# Patient Record
Sex: FEMALE | Race: WHITE | NOT HISPANIC OR LATINO | Employment: UNEMPLOYED | URBAN - METROPOLITAN AREA
[De-identification: names, ages, dates, MRNs, and addresses within clinical notes are randomized per-mention and may not be internally consistent; named-entity substitution may affect disease eponyms.]

---

## 2022-04-07 ENCOUNTER — HISTORICAL (OUTPATIENT)
Dept: ADMINISTRATIVE | Facility: HOSPITAL | Age: 67
End: 2022-04-07

## 2022-04-24 VITALS
OXYGEN SATURATION: 100 % | WEIGHT: 146.63 LBS | BODY MASS INDEX: 25.03 KG/M2 | HEIGHT: 64 IN | DIASTOLIC BLOOD PRESSURE: 78 MMHG | SYSTOLIC BLOOD PRESSURE: 119 MMHG

## 2022-11-09 ENCOUNTER — OFFICE VISIT (OUTPATIENT)
Dept: FAMILY MEDICINE | Facility: CLINIC | Age: 67
End: 2022-11-09
Payer: MEDICARE

## 2022-11-09 VITALS
OXYGEN SATURATION: 100 % | HEIGHT: 64 IN | TEMPERATURE: 98 F | HEART RATE: 82 BPM | DIASTOLIC BLOOD PRESSURE: 75 MMHG | SYSTOLIC BLOOD PRESSURE: 112 MMHG | BODY MASS INDEX: 24.92 KG/M2 | RESPIRATION RATE: 18 BRPM | WEIGHT: 146 LBS

## 2022-11-09 DIAGNOSIS — L82.1 SEBORRHEIC KERATOSIS: ICD-10-CM

## 2022-11-09 DIAGNOSIS — Z85.820 H/O MALIGNANT MELANOMA: Primary | ICD-10-CM

## 2022-11-09 PROCEDURE — 99214 OFFICE O/P EST MOD 30 MIN: CPT | Mod: PBBFAC | Performed by: FAMILY MEDICINE

## 2022-11-09 PROCEDURE — 17110 DESTRUCTION B9 LES UP TO 14: CPT | Mod: PBBFAC | Performed by: FAMILY MEDICINE

## 2022-11-09 NOTE — PROGRESS NOTES
Subjective:       Patient ID: Bronwyn Mistry is a 67 y.o. female.    Chief Complaint: skin evaluation    HPI    68 yo with h/o of melanoma of left forearm and BCC on nose presents to minor surgery with complaint of skin lesions on her face. States that these were not present in the past and are bothersome to her. She has not been followed by dermatology recently (last with Dr Alexander in 2018).     Review of Systems  As per HPI         Objective:      Vitals:    11/09/22 1415   BP: 112/75   Pulse: 82   Resp: 18   Temp: 97.5 °F (36.4 °C)       Physical Exam    Gen: alert, no acute distress  Skin: site of melanoma excision on left forearm clear to inspection; BCC site on nose also appears clear. Several stuck on slightly hyperpigmented lesions consistent with SK across face. Dilated pore on back. Diffusely dry skin. Some solar lentigines present.   Psych: cooperative, appropriate mood and affect      Assessment/Plan:  H/O Malignant melanoma  -     Ambulatory referral/consult to Dermatology; Future; Expected date: 11/16/2022  -     Ambulatory referral/consult to Ophthalmology; Future; Expected date: 11/16/2022    Seborrheic keratosis  - cryo today  - Post care instructions and return precautions discussed.     Will also have patient return for excision of dilated pore     Follow up in about 4 weeks (around 12/7/2022).

## 2022-12-07 ENCOUNTER — OFFICE VISIT (OUTPATIENT)
Dept: FAMILY MEDICINE | Facility: CLINIC | Age: 67
End: 2022-12-07
Payer: MEDICARE

## 2022-12-07 VITALS
WEIGHT: 146 LBS | HEIGHT: 64 IN | BODY MASS INDEX: 24.92 KG/M2 | HEART RATE: 82 BPM | DIASTOLIC BLOOD PRESSURE: 85 MMHG | TEMPERATURE: 99 F | OXYGEN SATURATION: 100 % | SYSTOLIC BLOOD PRESSURE: 128 MMHG | RESPIRATION RATE: 18 BRPM

## 2022-12-07 DIAGNOSIS — D23.9 DILATED PORE OF WINER: Primary | ICD-10-CM

## 2022-12-07 DIAGNOSIS — Z85.820 H/O MALIGNANT MELANOMA: ICD-10-CM

## 2022-12-07 PROCEDURE — 99213 OFFICE O/P EST LOW 20 MIN: CPT | Mod: PBBFAC | Performed by: FAMILY MEDICINE

## 2022-12-07 NOTE — PROGRESS NOTES
Subjective:       Patient ID: Bronwyn Mistry is a 67 y.o. female.    Chief Complaint: dilated pore    HPI  66 yo with h/o of melanoma of her left forearm and BCC of her nose returns to minor surgery clinic. She had SKs treated with cryo at the last appointment and reports resolution of them. Pt also has a dilated pore on her back that she originally wanted removed but no longer wishes to do so at this time. She was previously followed in derm clinic with Dr Alexander (last in 2018) and has been referred back to dermatology clinic.     Review of Systems  As per HPI         Objective:      Vitals:    12/07/22 1451   BP: 128/85   Pulse: 82   Resp: 18   Temp: 98.6 °F (37 °C)       Physical Exam      Gen: alert, no acute distress  Skin: site of melanoma excision on left forearm clear to inspection; BCC site on nose also appears clear. Dilated pore on back. Diffusely dry skin. Some solar lentigines present.   Psych: cooperative, appropriate mood and affect    Assessment/Plan:  Dilated pore of Virginie    H/O Malignant melanoma    - patient declines any procedure today  - pt to call clinic if she wishes for removal or for any other changing lesions/concerns  - keep dermatology follow up for skin checks     Follow up if symptoms worsen or fail to improve.

## 2022-12-13 ENCOUNTER — OFFICE VISIT (OUTPATIENT)
Dept: OPHTHALMOLOGY | Facility: CLINIC | Age: 67
End: 2022-12-13
Payer: MEDICARE

## 2022-12-13 VITALS — HEIGHT: 63 IN | BODY MASS INDEX: 25.87 KG/M2 | WEIGHT: 146 LBS

## 2022-12-13 DIAGNOSIS — Z85.820 H/O MALIGNANT MELANOMA: ICD-10-CM

## 2022-12-13 PROCEDURE — 92250 FUNDUS PHOTOGRAPHY W/I&R: CPT | Mod: PBBFAC,PO | Performed by: STUDENT IN AN ORGANIZED HEALTH CARE EDUCATION/TRAINING PROGRAM

## 2022-12-13 PROCEDURE — 99213 OFFICE O/P EST LOW 20 MIN: CPT | Mod: PBBFAC,PO | Performed by: STUDENT IN AN ORGANIZED HEALTH CARE EDUCATION/TRAINING PROGRAM

## 2022-12-13 RX ORDER — LINACLOTIDE 145 UG/1
145 CAPSULE, GELATIN COATED ORAL EVERY MORNING
Status: ON HOLD | COMMUNITY
Start: 2022-08-07 | End: 2023-04-17 | Stop reason: HOSPADM

## 2022-12-13 RX ORDER — AMITRIPTYLINE HYDROCHLORIDE 150 MG/1
150 TABLET ORAL NIGHTLY
COMMUNITY
Start: 2022-11-08

## 2022-12-13 RX ORDER — VENLAFAXINE HYDROCHLORIDE 150 MG/1
150 CAPSULE, EXTENDED RELEASE ORAL
Status: ON HOLD | COMMUNITY
Start: 2022-07-20 | End: 2023-04-17 | Stop reason: HOSPADM

## 2022-12-13 RX ORDER — ROSUVASTATIN CALCIUM 10 MG/1
10 TABLET, COATED ORAL
COMMUNITY
Start: 2022-08-29

## 2022-12-13 RX ORDER — CLONAZEPAM 1 MG/1
1 TABLET ORAL 2 TIMES DAILY PRN
COMMUNITY
Start: 2022-11-29

## 2022-12-13 RX ORDER — ONDANSETRON 8 MG/1
8 TABLET, ORALLY DISINTEGRATING ORAL EVERY 6 HOURS PRN
Status: ON HOLD | COMMUNITY
Start: 2022-09-22 | End: 2023-04-17 | Stop reason: HOSPADM

## 2022-12-13 RX ORDER — PHENAZOPYRIDINE HYDROCHLORIDE 100 MG/1
200 TABLET, FILM COATED ORAL 3 TIMES DAILY
Status: ON HOLD | COMMUNITY
Start: 2022-06-23 | End: 2023-04-17 | Stop reason: HOSPADM

## 2022-12-13 RX ORDER — CALCIUM CARBONATE 600 MG
1 TABLET ORAL DAILY
COMMUNITY
Start: 2022-02-21

## 2022-12-13 RX ORDER — PROPRANOLOL HYDROCHLORIDE 20 MG/1
20 TABLET ORAL 2 TIMES DAILY
COMMUNITY
Start: 2022-12-05

## 2022-12-13 RX ORDER — VENLAFAXINE HYDROCHLORIDE 75 MG/1
1 TABLET, EXTENDED RELEASE ORAL
COMMUNITY
Start: 2022-12-01

## 2022-12-13 RX ORDER — BREXPIPRAZOLE 2 MG/1
1 TABLET ORAL
Status: ON HOLD | COMMUNITY
Start: 2022-11-13 | End: 2023-04-17 | Stop reason: HOSPADM

## 2022-12-13 RX ORDER — SULFAMETHOXAZOLE AND TRIMETHOPRIM 800; 160 MG/1; MG/1
1 TABLET ORAL 2 TIMES DAILY
COMMUNITY
Start: 2022-10-24 | End: 2023-04-10 | Stop reason: CLARIF

## 2022-12-13 RX ORDER — ANASTROZOLE 1 MG/1
1 TABLET ORAL
COMMUNITY
Start: 2022-09-06

## 2022-12-13 RX ORDER — ERGOCALCIFEROL 1.25 MG/1
50000 CAPSULE ORAL
COMMUNITY
Start: 2022-11-10

## 2022-12-13 NOTE — PROGRESS NOTES
HPI     Blurred Vision     Additional comments: OU distance and near. Patient does not wear any   glasses.            Melanoma     Additional comments: History of malignant melanoma left forearm and BCC   nose. Patient's PCP referred her over to have eyes checked to make sure   she does not have melanoma in eyes.           Last edited by Santhosh Acosta MA on 12/13/2022 11:09 AM.      Assessment /Plan     For exam results, see Encounter Report.    H/O Malignant melanoma  -     Ambulatory referral/consult to Ophthalmology      1. Hx of melanoma and nasal BCC w flap  - referred for eye exam to check for choroidal melanoma  - dfe wnl  - fundus photos 12/13/22    2. Refractive error  - discussed would likely see better given pinhole results but happy with otc readers  - offered return in future if she wants mrx updated.    RTC 1 year or sooner for MRx

## 2023-03-10 ENCOUNTER — OFFICE VISIT (OUTPATIENT)
Dept: DERMATOLOGY | Facility: CLINIC | Age: 68
End: 2023-03-10
Payer: MEDICARE

## 2023-03-10 VITALS
DIASTOLIC BLOOD PRESSURE: 89 MMHG | HEART RATE: 86 BPM | HEIGHT: 63 IN | RESPIRATION RATE: 18 BRPM | OXYGEN SATURATION: 97 % | BODY MASS INDEX: 26.22 KG/M2 | SYSTOLIC BLOOD PRESSURE: 133 MMHG | WEIGHT: 148 LBS | TEMPERATURE: 98 F

## 2023-03-10 DIAGNOSIS — Z85.828 HISTORY OF BASAL CELL CANCER: Primary | ICD-10-CM

## 2023-03-10 DIAGNOSIS — Z85.820 H/O MALIGNANT MELANOMA: ICD-10-CM

## 2023-03-10 PROBLEM — K59.00 CONSTIPATION: Status: ACTIVE | Noted: 2023-03-10

## 2023-03-10 PROBLEM — E78.5 HYPERLIPIDEMIA: Status: ACTIVE | Noted: 2022-01-12

## 2023-03-10 PROBLEM — D05.12 DUCTAL CARCINOMA IN SITU (DCIS) OF LEFT BREAST: Status: ACTIVE | Noted: 2022-01-12

## 2023-03-10 PROBLEM — R92.8 ABNORMAL ULTRASOUND OF BREAST: Status: ACTIVE | Noted: 2021-12-09

## 2023-03-10 PROBLEM — Z79.811 USE OF ANASTROZOLE (ARIMIDEX): Status: ACTIVE | Noted: 2022-07-18

## 2023-03-10 PROBLEM — Z80.3 FAMILY HISTORY OF BREAST CANCER IN SISTER: Status: ACTIVE | Noted: 2021-12-09

## 2023-03-10 PROBLEM — F41.9 ANXIETY: Status: ACTIVE | Noted: 2023-03-10

## 2023-03-10 PROBLEM — I10 HYPERTENSION: Status: ACTIVE | Noted: 2022-01-12

## 2023-03-10 PROBLEM — R10.13 EPIGASTRIC PAIN: Status: ACTIVE | Noted: 2023-03-10

## 2023-03-10 PROCEDURE — 17000 DESTRUCT PREMALG LESION: CPT | Mod: PBBFAC

## 2023-03-10 PROCEDURE — 88305 TISSUE EXAM BY PATHOLOGIST: CPT | Mod: TC | Performed by: DERMATOLOGY

## 2023-03-10 PROCEDURE — 11302 SHAVE SKIN LESION 1.1-2.0 CM: CPT | Mod: PBBFAC | Performed by: STUDENT IN AN ORGANIZED HEALTH CARE EDUCATION/TRAINING PROGRAM

## 2023-03-10 PROCEDURE — 99215 OFFICE O/P EST HI 40 MIN: CPT | Mod: PBBFAC | Performed by: DERMATOLOGY

## 2023-03-10 PROCEDURE — 17003 DESTRUCT PREMALG LES 2-14: CPT | Mod: PBBFAC | Performed by: STUDENT IN AN ORGANIZED HEALTH CARE EDUCATION/TRAINING PROGRAM

## 2023-03-10 RX ORDER — BREXPIPRAZOLE 2 MG/1
1 TABLET ORAL DAILY
COMMUNITY
Start: 2023-02-15

## 2023-03-10 RX ORDER — ONDANSETRON 8 MG/1
1 TABLET, ORALLY DISINTEGRATING ORAL EVERY 6 HOURS PRN
Status: ON HOLD | COMMUNITY
Start: 2022-09-22 | End: 2023-04-17 | Stop reason: HOSPADM

## 2023-03-10 RX ORDER — LIDOCAINE HYDROCHLORIDE 10 MG/ML
5 INJECTION INFILTRATION; PERINEURAL ONCE
Status: COMPLETED | OUTPATIENT
Start: 2023-03-10 | End: 2023-03-10

## 2023-03-10 RX ADMIN — LIDOCAINE HYDROCHLORIDE 5 ML: 10 INJECTION INFILTRATION; PERINEURAL at 10:03

## 2023-03-10 NOTE — PROGRESS NOTES
Subjective:       Patient ID: Bronwyn Mistry is a 68 y.o. female.    Chief Complaint: skin evaluation    HPI  69 yo F with PMH of  malignant melanoma and basal cell carcinoma is here for skin cancer screening,    She has remote history of cancer treated with skin excision. Patient reports feeling there are several abnormal skin lesions.     Review of Systems    See above  Objective:      Vitals:    03/10/23 0919   BP: 133/89   Pulse: 86   Resp: 18   Temp: 97.8 °F (36.6 °C)     Physical Exam    Gen jb: NAD  Skin: Right forearm with a irregular shaped, scaly patch with raised edge   Right lateral nasal bridge with rough, raised patch    Procedures:  Cryosurgery to 2 actinic keratoses - right nasal sidewall. Discussed with patient that areas treated with blister (possibly for blood blister), scab and peel off within the next two to three weeks.     Procedure: Shave biopsy of Right Forearm  After obtaining informed consent,  a time out was performed. The area was prepped and draped in the usual fashion. Anesthesia was obtained with 1 % lidocaine. The superficial layers of the lesion were removed by shave biopsy. Hemostasis obtained with pressure and Drysol. Bandage applied over wounds. Wound care discussed. Specimen sent for dermatopathology.     Assessment:       Problem List Items Addressed This Visit          Oncology    H/O Malignant melanoma     Other Visit Diagnoses       History of basal cell cancer    -  Primary    Relevant Orders    Specimen to Pathology Dermatology and skin neoplasms              Plan:         1. H/O Malignant melanoma  2. History of basal cell cancer    - Specimen to Pathology Dermatology and skin neoplasms  - Ambulatory referral/consult to Dermatology  - S/p cryotherapy of 2 lesions located on right nasal fold   - S/p shave biopsy of right forearm     RTC 6 months for another general skin evaluation

## 2023-03-14 LAB
ESTROGEN SERPL-MCNC: NORMAL PG/ML
INSULIN SERPL-ACNC: NORMAL U[IU]/ML
LAB AP CLINICAL INFORMATION: NORMAL
LAB AP GROSS DESCRIPTION: NORMAL
LAB AP REPORT FOOTNOTES: NORMAL
T3RU NFR SERPL: NORMAL %

## 2023-03-15 ENCOUNTER — TELEPHONE (OUTPATIENT)
Dept: HEPATOLOGY | Facility: HOSPITAL | Age: 68
End: 2023-03-15
Payer: MEDICARE

## 2023-03-15 DIAGNOSIS — C44.91 BASAL CELL CARCINOMA (BCC), UNSPECIFIED SITE: Primary | ICD-10-CM

## 2023-03-15 NOTE — TELEPHONE ENCOUNTER
03/15/2023      Reviewed Path report. Pt needs further eval and surgical removal of BCC. Attempted to get in touch with MsAidan Mistry x 2.         DESTINEE

## 2023-03-20 ENCOUNTER — TELEPHONE (OUTPATIENT)
Dept: HEPATOLOGY | Facility: HOSPITAL | Age: 68
End: 2023-03-20
Payer: MEDICARE

## 2023-03-20 DIAGNOSIS — C44.91 BASAL CELL CARCINOMA (BCC), UNSPECIFIED SITE: Primary | ICD-10-CM

## 2023-03-20 NOTE — TELEPHONE ENCOUNTER
03/20/2023    Reviewed Path report. Pt needs further eval and surgical removal of BCC.  Referral to Gen Surgery clinic - urgent. Order placed and patient is aware.    DESTINEE

## 2023-03-27 ENCOUNTER — OFFICE VISIT (OUTPATIENT)
Dept: PLASTIC SURGERY | Facility: CLINIC | Age: 68
End: 2023-03-27
Payer: MEDICARE

## 2023-03-27 VITALS
WEIGHT: 165 LBS | SYSTOLIC BLOOD PRESSURE: 123 MMHG | OXYGEN SATURATION: 98 % | HEART RATE: 78 BPM | TEMPERATURE: 98 F | DIASTOLIC BLOOD PRESSURE: 80 MMHG | BODY MASS INDEX: 28.17 KG/M2 | HEIGHT: 64 IN

## 2023-03-27 DIAGNOSIS — C44.91 BASAL CELL CARCINOMA (BCC), UNSPECIFIED SITE: ICD-10-CM

## 2023-03-27 PROCEDURE — 3074F SYST BP LT 130 MM HG: CPT | Mod: CPTII,,, | Performed by: SURGERY

## 2023-03-27 PROCEDURE — 1126F AMNT PAIN NOTED NONE PRSNT: CPT | Mod: CPTII,,, | Performed by: SURGERY

## 2023-03-27 PROCEDURE — 1159F PR MEDICATION LIST DOCUMENTED IN MEDICAL RECORD: ICD-10-PCS | Mod: CPTII,,, | Performed by: SURGERY

## 2023-03-27 PROCEDURE — 1126F PR PAIN SEVERITY QUANTIFIED, NO PAIN PRESENT: ICD-10-PCS | Mod: CPTII,,, | Performed by: SURGERY

## 2023-03-27 PROCEDURE — 3008F BODY MASS INDEX DOCD: CPT | Mod: CPTII,,, | Performed by: SURGERY

## 2023-03-27 PROCEDURE — 3008F PR BODY MASS INDEX (BMI) DOCUMENTED: ICD-10-PCS | Mod: CPTII,,, | Performed by: SURGERY

## 2023-03-27 PROCEDURE — 99203 OFFICE O/P NEW LOW 30 MIN: CPT | Mod: S$PBB,,, | Performed by: SURGERY

## 2023-03-27 PROCEDURE — 3074F PR MOST RECENT SYSTOLIC BLOOD PRESSURE < 130 MM HG: ICD-10-PCS | Mod: CPTII,,, | Performed by: SURGERY

## 2023-03-27 PROCEDURE — 99215 OFFICE O/P EST HI 40 MIN: CPT | Mod: PBBFAC | Performed by: SURGERY

## 2023-03-27 PROCEDURE — 99203 PR OFFICE/OUTPT VISIT, NEW, LEVL III, 30-44 MIN: ICD-10-PCS | Mod: S$PBB,,, | Performed by: SURGERY

## 2023-03-27 PROCEDURE — 3079F DIAST BP 80-89 MM HG: CPT | Mod: CPTII,,, | Performed by: SURGERY

## 2023-03-27 PROCEDURE — 3079F PR MOST RECENT DIASTOLIC BLOOD PRESSURE 80-89 MM HG: ICD-10-PCS | Mod: CPTII,,, | Performed by: SURGERY

## 2023-03-27 PROCEDURE — 1159F MED LIST DOCD IN RCRD: CPT | Mod: CPTII,,, | Performed by: SURGERY

## 2023-03-27 RX ORDER — ONDANSETRON 2 MG/ML
4 INJECTION INTRAMUSCULAR; INTRAVENOUS EVERY 12 HOURS PRN
Status: CANCELLED | OUTPATIENT
Start: 2023-03-27

## 2023-03-27 RX ORDER — HEPARIN SODIUM 5000 [USP'U]/ML
5000 INJECTION, SOLUTION INTRAVENOUS; SUBCUTANEOUS EVERY 8 HOURS
Status: CANCELLED | OUTPATIENT
Start: 2023-03-27

## 2023-03-27 RX ORDER — SODIUM CHLORIDE 9 MG/ML
INJECTION, SOLUTION INTRAVENOUS CONTINUOUS
Status: CANCELLED | OUTPATIENT
Start: 2023-03-27

## 2023-03-27 NOTE — MEDICAL/APP STUDENT
U Surgery/General Surgery  History & Physical  Chief Complaint:   Basal cell carcinoma    History of Present Illness:  Bronwyn Mistry is a 68 y.o. female with PMHx of malignant melanoma and basal cell carcinoma presents to clinic for surgical evaluation of basal cell carcinoma. She previously had been diagnosed with basal cell carcinoma of the L nose s/p excision about 8 years ago and malignant melanoma on the L forearm in 2009 s/p excision. She reports a prior history of significant usage of tanning beds and sun exposure with no skin protection. She recently saw Dr. Barakat for a skin screening on 3/10/2023, which revealed a basal cell carcinoma on the R forearm via shave biopsy. Medical history includes HTN treated with propranolol. Previous surgical history includes removal of melanoma and basal cell carcinoma via skin excision surgery, intraductal carcinoma in situ s/p lumpectomy, hysterectomy, and tonsillectomy with no known surgical complications. No prior history of myocardial infarctions or strokes. Currently does not use any blood thinners.     Review of Systems:  Negative other than stated in HPI on 10 point review of systems.     Allergies:  Review of patient's allergies indicates:   Allergen Reactions    Penicillins        Home Medications:  Current Outpatient Medications on File Prior to Visit   Medication Sig    amitriptyline (ELAVIL) 150 MG Tab Take 150 mg by mouth every evening.    anastrozole (ARIMIDEX) 1 mg Tab Take 1 mg by mouth.    brexpiprazole (REXULTI) 2 mg Tab Take 1 tablet by mouth once daily.    calcium carbonate (OS-SALLIE) 600 mg calcium (1,500 mg) Tab Take 1 tablet by mouth once daily.    clonazePAM (KLONOPIN) 1 MG tablet Take 1 mg by mouth 2 (two) times daily as needed.    ergocalciferol (ERGOCALCIFEROL) 50,000 unit Cap Take 50,000 Units by mouth every 7 days.    LINZESS 145 mcg Cap capsule Take 145 mcg by mouth every morning.    ondansetron (ZOFRAN-ODT) 8 MG TbDL Take 8 mg by mouth every  6 (six) hours as needed.    ondansetron (ZOFRAN-ODT) 8 MG TbDL Take 1 tablet by mouth every 6 (six) hours as needed.    phenazopyridine (PYRIDIUM) 100 MG tablet Take 200 mg by mouth 3 (three) times daily.    propranoloL (INDERAL) 20 MG tablet Take 20 mg by mouth 2 (two) times daily.    REXULTI 2 mg Tab Take 1 tablet by mouth.    rosuvastatin (CRESTOR) 10 MG tablet Take 10 mg by mouth.    sulfamethoxazole-trimethoprim 800-160mg (BACTRIM DS) 800-160 mg Tab Take 1 tablet by mouth 2 (two) times daily.    venlafaxine (EFFEXOR-XR) 150 MG Cp24 Take 150 mg by mouth.    venlafaxine 75 mg TR24 Take 1 tablet by mouth.     No current facility-administered medications on file prior to visit.       Past Medical History:  Past Medical History:   Diagnosis Date    Malignant melanoma of skin, unspecified    Basal cell carcinoma on the L nose s/p excision  Intraductal carcinoma in situ s/p lumpectomy    Past Surgical History:  History reviewed. No pertinent surgical history.    Family History:   Family History   Problem Relation Age of Onset    Breast cancer Sister        Social History:   Social History     Tobacco Use    Smoking status: Never    Smokeless tobacco: Never   Substance Use Topics    Alcohol use: Not Currently    Drug use: Not Currently        Vital Signs:  Temp: 98.2 °F (36.8 °C) (03/27/23 1356)  Pulse: 78 (03/27/23 1356)  BP: 123/80 (03/27/23 1356)  SpO2: 98 % (03/27/23 1356)    Physical Exam:  General: well developed, well nourished, no distress  HEENT: normocephalic, atraumatic, hearing grossly normal bilaterally, mucous membranes moist, EOM intact, no scleral icterus  Neck: supple, symmetrical, trachea midline, no JVD  Lungs:  clear to auscultation bilaterally and normal respiratory effort  Cardiovascular: regular rate and rhythm.  Extremities: no cyanosis or edema, or clubbing, distal pulses palpable and symmetric  Abdomen: soft, non-tender to palpation, no distention, no masses/hernias  Skin: R forearm shows  "erythematous circular lesion with central ulceration s/p shave biopsy.  Musculoskeletal:no clubbing, cyanosis, no deformities  Neurologic: No focal numbness or weakness  Psych/Behavioral:  Alert and oriented, appropriate affect.    Laboratory:  No results for input(s): WBC, HGB, HCT, PLT, NA, K, CL, CO2, BUN, CREATININE, BILITOT, AST, ALT, ALKPHOS, CALCIUM, ALBUMIN, PROT, MG, PHOS in the last 72 hours.    Diagnostic Results:  3/14/2023 12:12 PM - Lisa Reeves MD    Component   Case Report   [facility] Surgical Pathology                            Case: [id number]                                 Authorizing Provider:  Yamile Phipps MD        Collected:           03/10/2023 10:45 AM           Ordering Location:     [facility] -       Received:            03/13/2023 10:44 AM                                  Dermatology                                                                   Pathologist:           Lisa Reeves MD                                                         Specimen:    Forearm, Right                                                                          Final Diagnosis   1. Skin Lesion, Right Forearm, Shave Biopsy :   - Basal cell carcinoma.  - The tumor is present at the deep surgical margin centrally.        Clinical Information   Hx of Basal cell carcinoma   Microscopic Description   A microscopic examination was performed and the diagnosis reflects the findings.         Gross Description   1. Forearm, Right, :   Received in 10% neutral buffered formalin is a shave biopsy of lightly pigmented skin measuring 8 x 7 x 2 mm.  Sectioned and entirely submitted.   Report Footnotes   Unless the case is a "gross only" or additional testing only, the final diagnosis for each specimen is based on a microscopic examination of appropriate tissue sections.       ASSESSMENT:   Ms. Bronwyn Mistry is a 68 y.o. female with a past medical history of malignant melanoma on L forearm and basal cell " carcinoma on L nose s/p excision presents with basal cell carcinoma of the R forearm confirmed by shave biopsy. Patient is currently stable and is a candidate for skin excision surgery.      PLAN:   # Basal cell carcinoma  - Scheduled patient for skin excision.    Inocencio Barksdale, MS3  [facility] [employer]  3/27/2023

## 2023-03-27 NOTE — PROGRESS NOTES
U Surgery/General Surgery  History & Physical  Chief Complaint:   Basal cell carcinoma     History of Present Illness:  Bronwyn Mistry is a 68 y.o. female with PMHx of HTN, breast cancer s/p lumpectomy and radiation, malignant melanoma of LUE and basal cell carcinoma of the nose presents to clinic for surgical evaluation of basal cell carcinoma. She previously had been diagnosed with basal cell carcinoma of the L nose s/p excision about 8 years ago and malignant melanoma on the L forearm in 2009 s/p excision. She reports a prior history of significant usage of tanning beds and sun exposure with no skin protection. She recently saw Dr. Barakat for a skin screening on 3/10/2023, which revealed a basal cell carcinoma on the R forearm via shave biopsy. Medical history includes HTN treated with propranolol. Previous surgical history includes removal of melanoma and basal cell carcinoma via skin excision surgery, intraductal carcinoma in situ s/p lumpectomy in 2/2022, hysterectomy, and tonsillectomy with no known surgical complications. No prior history of myocardial infarctions or strokes. Currently does not use any blood thinners.      Review of Systems:  Negative other than stated in HPI on 10 point review of systems.      Allergies:       Review of patient's allergies indicates:   Allergen Reactions    Penicillins           Home Medications:       Current Outpatient Medications on File Prior to Visit   Medication Sig    amitriptyline (ELAVIL) 150 MG Tab Take 150 mg by mouth every evening.    anastrozole (ARIMIDEX) 1 mg Tab Take 1 mg by mouth.    brexpiprazole (REXULTI) 2 mg Tab Take 1 tablet by mouth once daily.    calcium carbonate (OS-SALLIE) 600 mg calcium (1,500 mg) Tab Take 1 tablet by mouth once daily.    clonazePAM (KLONOPIN) 1 MG tablet Take 1 mg by mouth 2 (two) times daily as needed.    ergocalciferol (ERGOCALCIFEROL) 50,000 unit Cap Take 50,000 Units by mouth every 7 days.    LINZESS 145 mcg Cap capsule Take  145 mcg by mouth every morning.    ondansetron (ZOFRAN-ODT) 8 MG TbDL Take 8 mg by mouth every 6 (six) hours as needed.    ondansetron (ZOFRAN-ODT) 8 MG TbDL Take 1 tablet by mouth every 6 (six) hours as needed.    phenazopyridine (PYRIDIUM) 100 MG tablet Take 200 mg by mouth 3 (three) times daily.    propranoloL (INDERAL) 20 MG tablet Take 20 mg by mouth 2 (two) times daily.    REXULTI 2 mg Tab Take 1 tablet by mouth.    rosuvastatin (CRESTOR) 10 MG tablet Take 10 mg by mouth.    sulfamethoxazole-trimethoprim 800-160mg (BACTRIM DS) 800-160 mg Tab Take 1 tablet by mouth 2 (two) times daily.    venlafaxine (EFFEXOR-XR) 150 MG Cp24 Take 150 mg by mouth.    venlafaxine 75 mg TR24 Take 1 tablet by mouth.      No current facility-administered medications on file prior to visit.         Past Medical History:       Past Medical History:   Diagnosis Date    Malignant melanoma of skin, unspecified     Basal cell carcinoma on the L nose s/p excision  Intraductal carcinoma in situ s/p lumpectomy     Past Surgical History:  History reviewed. No pertinent surgical history.     Family History:         Family History   Problem Relation Age of Onset    Breast cancer Sister           Social History:   Social History           Tobacco Use    Smoking status: Never    Smokeless tobacco: Never   Substance Use Topics    Alcohol use: Not Currently    Drug use: Not Currently         Vital Signs:  Temp: 98.2 °F (36.8 °C) (03/27/23 1356)  Pulse: 78 (03/27/23 1356)  BP: 123/80 (03/27/23 1356)  SpO2: 98 % (03/27/23 1356)     Physical Exam:  General: well developed, well nourished, no distress  HEENT: normocephalic, atraumatic, hearing grossly normal bilaterally, mucous membranes moist, EOM intact, no scleral icterus  Neck: supple, symmetrical, trachea midline, no JVD  Lungs:  clear to auscultation bilaterally and normal respiratory effort  Cardiovascular: regular rate and rhythm.  Extremities: no cyanosis or edema, or clubbing, distal pulses  "palpable and symmetric  Abdomen: soft, non-tender to palpation, no distention, no masses/hernias  Skin: R forearm shows erythematous circular lesion with central ulceration s/p shave biopsy.  Musculoskeletal:no clubbing, cyanosis, no deformities  Neurologic: No focal numbness or weakness  Psych/Behavioral:  Alert and oriented, appropriate affect.      Laboratory:  No results for input(s): WBC, HGB, HCT, PLT, NA, K, CL, CO2, BUN, CREATININE, BILITOT, AST, ALT, ALKPHOS, CALCIUM, ALBUMIN, PROT, MG, PHOS in the last 72 hours.     Diagnostic Results:  3/14/2023 12:12 PM - Lisa Reeves MD     Component   Case Report   [facility] Surgical Pathology                            Case: [id number]                                 Authorizing Provider:  Yamile Phipps MD        Collected:           03/10/2023 10:45 AM           Ordering Location:     [facility] -       Received:            03/13/2023 10:44 AM                                  Dermatology                                                                   Pathologist:           Lisa Reeves MD                                                         Specimen:    Forearm, Right                                                                          Final Diagnosis   1. Skin Lesion, Right Forearm, Shave Biopsy :   - Basal cell carcinoma.  - The tumor is present at the deep surgical margin centrally.        Clinical Information   Hx of Basal cell carcinoma   Microscopic Description   A microscopic examination was performed and the diagnosis reflects the findings.         Gross Description   1. Forearm, Right, :   Received in 10% neutral buffered formalin is a shave biopsy of lightly pigmented skin measuring 8 x 7 x 2 mm.  Sectioned and entirely submitted.   Report Footnotes   Unless the case is a "gross only" or additional testing only, the final diagnosis for each specimen is based on a microscopic examination of appropriate tissue sections.       "   ASSESSMENT:   Ms. Bronwyn Mistry is a 68 y.o. female with a past medical history of malignant melanoma on L forearm and basal cell carcinoma on L nose s/p excision presents with basal cell carcinoma of the R forearm confirmed by shave biopsy. Patient is currently stable and is a candidate for skin excision surgery.        PLAN:   # Basal cell carcinoma  - Scheduled patient for skin excision.     Inocencio Barksdale MS3  [facility] [employer]  3/27/2023    Resident Addendum:  Agree with above documentation by medical student.   68F PMHx breast cancer s/p lumpectomy and radiation, BCC of nose, melanoma of the LUE s/p excision, HTN presenting for bx proven BCC of the right forearm.   Will plan for surgical excision 4/17/2023  RBA discussed and written consent obtained  Orders placed     Madeleine Redman MD  \[facility]\"" General Surgery PGY3

## 2023-03-27 NOTE — PROGRESS NOTES
Patient seen by Dr. REDDY Boateng. Scheduled for surgery 4/17/23 will return 2 weeks after for post op. Written and verbal discharge instructions given.

## 2023-03-27 NOTE — H&P (VIEW-ONLY)
[facility]/General Surgery  History & Physical  Chief Complaint:   Basal cell carcinoma     History of Present Illness:  Bronwyn Mistry is a 68 y.o. female with PMHx of HTN, breast cancer s/p lumpectomy and radiation, malignant melanoma of LUE and basal cell carcinoma of the nose presents to clinic for surgical evaluation of basal cell carcinoma. She previously had been diagnosed with basal cell carcinoma of the L nose s/p excision about 8 years ago and malignant melanoma on the L forearm in 2009 s/p excision. She reports a prior history of significant usage of tanning beds and sun exposure with no skin protection. She recently saw Dr. Barakat for a skin screening on 3/10/2023, which revealed a basal cell carcinoma on the R forearm via shave biopsy. Medical history includes HTN treated with propranolol. Previous surgical history includes removal of melanoma and basal cell carcinoma via skin excision surgery, intraductal carcinoma in situ s/p lumpectomy in 2/2022, hysterectomy, and tonsillectomy with no known surgical complications. No prior history of myocardial infarctions or strokes. Currently does not use any blood thinners.      Review of Systems:  Negative other than stated in HPI on 10 point review of systems.      Allergies:       Review of patient's allergies indicates:   Allergen Reactions    Penicillins           Home Medications:       Current Outpatient Medications on File Prior to Visit   Medication Sig    amitriptyline (ELAVIL) 150 MG Tab Take 150 mg by mouth every evening.    anastrozole (ARIMIDEX) 1 mg Tab Take 1 mg by mouth.    brexpiprazole (REXULTI) 2 mg Tab Take 1 tablet by mouth once daily.    calcium carbonate (OS-SALLIE) 600 mg calcium (1,500 mg) Tab Take 1 tablet by mouth once daily.    clonazePAM (KLONOPIN) 1 MG tablet Take 1 mg by mouth 2 (two) times daily as needed.    ergocalciferol (ERGOCALCIFEROL) 50,000 unit Cap Take 50,000 Units by mouth every 7 days.    LINZESS 145 mcg Cap capsule Take  145 mcg by mouth every morning.    ondansetron (ZOFRAN-ODT) 8 MG TbDL Take 8 mg by mouth every 6 (six) hours as needed.    ondansetron (ZOFRAN-ODT) 8 MG TbDL Take 1 tablet by mouth every 6 (six) hours as needed.    phenazopyridine (PYRIDIUM) 100 MG tablet Take 200 mg by mouth 3 (three) times daily.    propranoloL (INDERAL) 20 MG tablet Take 20 mg by mouth 2 (two) times daily.    REXULTI 2 mg Tab Take 1 tablet by mouth.    rosuvastatin (CRESTOR) 10 MG tablet Take 10 mg by mouth.    sulfamethoxazole-trimethoprim 800-160mg (BACTRIM DS) 800-160 mg Tab Take 1 tablet by mouth 2 (two) times daily.    venlafaxine (EFFEXOR-XR) 150 MG Cp24 Take 150 mg by mouth.    venlafaxine 75 mg TR24 Take 1 tablet by mouth.      No current facility-administered medications on file prior to visit.         Past Medical History:       Past Medical History:   Diagnosis Date    Malignant melanoma of skin, unspecified     Basal cell carcinoma on the L nose s/p excision  Intraductal carcinoma in situ s/p lumpectomy     Past Surgical History:  History reviewed. No pertinent surgical history.     Family History:         Family History   Problem Relation Age of Onset    Breast cancer Sister           Social History:   Social History           Tobacco Use    Smoking status: Never    Smokeless tobacco: Never   Substance Use Topics    Alcohol use: Not Currently    Drug use: Not Currently         Vital Signs:  Temp: 98.2 °F (36.8 °C) (03/27/23 1356)  Pulse: 78 (03/27/23 1356)  BP: 123/80 (03/27/23 1356)  SpO2: 98 % (03/27/23 1356)     Physical Exam:  General: well developed, well nourished, no distress  HEENT: normocephalic, atraumatic, hearing grossly normal bilaterally, mucous membranes moist, EOM intact, no scleral icterus  Neck: supple, symmetrical, trachea midline, no JVD  Lungs:  clear to auscultation bilaterally and normal respiratory effort  Cardiovascular: regular rate and rhythm.  Extremities: no cyanosis or edema, or clubbing, distal pulses  "palpable and symmetric  Abdomen: soft, non-tender to palpation, no distention, no masses/hernias  Skin: R forearm shows erythematous circular lesion with central ulceration s/p shave biopsy.  Musculoskeletal:no clubbing, cyanosis, no deformities  Neurologic: No focal numbness or weakness  Psych/Behavioral:  Alert and oriented, appropriate affect.      Laboratory:  No results for input(s): WBC, HGB, HCT, PLT, NA, K, CL, CO2, BUN, CREATININE, BILITOT, AST, ALT, ALKPHOS, CALCIUM, ALBUMIN, PROT, MG, PHOS in the last 72 hours.     Diagnostic Results:  3/14/2023 12:12 PM - Lisa Reeves MD     Component   Case Report   [facility]                            Case: [id number]                                 Authorizing Provider:  Yamile Phipps MD        Collected:           03/10/2023 10:45 AM           Ordering Location:     [facility] -       Received:            03/13/2023 10:44 AM                                  Dermatology                                                                   Pathologist:           Lisa Reeves MD                                                         Specimen:    Forearm, Right                                                                          Final Diagnosis   1. Skin Lesion, Right Forearm, Shave Biopsy :   - Basal cell carcinoma.  - The tumor is present at the deep surgical margin centrally.        Clinical Information   Hx of Basal cell carcinoma   Microscopic Description   A microscopic examination was performed and the diagnosis reflects the findings.         Gross Description   1. Forearm, Right, :   Received in 10% neutral buffered formalin is a shave biopsy of lightly pigmented skin measuring 8 x 7 x 2 mm.  Sectioned and entirely submitted.   Report Footnotes   Unless the case is a "gross only" or additional testing only, the final diagnosis for each specimen is based on a microscopic examination of appropriate tissue sections.       "   ASSESSMENT:   Ms. Bronwyn Mistry is a 68 y.o. female with a past medical history of malignant melanoma on L forearm and basal cell carcinoma on L nose s/p excision presents with basal cell carcinoma of the R forearm confirmed by shave biopsy. Patient is currently stable and is a candidate for skin excision surgery.        PLAN:   # Basal cell carcinoma  - Scheduled patient for skin excision.     Inocencio Barksdale MS3  [employer]  3/27/2023    Resident Addendum:  Agree with above documentation by medical student.   68F PMHx breast cancer s/p lumpectomy and radiation, BCC of nose, melanoma of the LUE s/p excision, HTN presenting for bx proven BCC of the right forearm.   Will plan for surgical excision 4/17/2023  RBA discussed and written consent obtained  Orders placed     Madeleine Redman MD  [facility] General Surgery PGY3

## 2023-04-10 RX ORDER — CARIPRAZINE 1.5 MG/1
1.5 CAPSULE, GELATIN COATED ORAL
COMMUNITY
Start: 2023-03-13

## 2023-04-13 ENCOUNTER — ANESTHESIA EVENT (OUTPATIENT)
Dept: SURGERY | Facility: HOSPITAL | Age: 68
End: 2023-04-13
Payer: MEDICARE

## 2023-04-13 NOTE — ANESTHESIA PREPROCEDURE EVALUATION
04/13/2023  Bronwyn Mistry is a 68 y.o., female with PMHx of HTN, HLD, anxiety/depression presents for excision of BCC Rt arm.    Propanolol--last dose 1030 DOS     Vitals:    04/17/23 0936 04/17/23 0936 04/17/23 1032 04/17/23 1110   BP:  (!) 130/91 (!) 130/91 (!) 160/102   Pulse:  83  80   Resp:    20   Temp:  36.7 °C (98 °F)  36.3 °C (97.3 °F)   TempSrc:  Oral  Temporal   SpO2:  97%  100%   Weight: 74.9 kg (165 lb 3.2 oz)          Active Ambulatory Problems     Diagnosis Date Noted    H/O Malignant melanoma 12/13/2022    Constipation 03/10/2023    Abnormal ultrasound of breast 12/09/2021    Anxiety 03/10/2023    Ductal carcinoma in situ (DCIS) of left breast 01/12/2022    Epigastric pain 03/10/2023    Family history of breast cancer in sister 12/09/2021    Hyperlipidemia 01/12/2022    Hypertension 01/12/2022    Use of anastrozole (Arimidex) 07/18/2022     Resolved Ambulatory Problems     Diagnosis Date Noted    No Resolved Ambulatory Problems     Past Medical History:   Diagnosis Date    Anxiety disorder, unspecified     Depression     Malignant melanoma of skin, unspecified            Pre-op Assessment    I have reviewed the Patient Summary Reports.     I have reviewed the Nursing Notes. I have reviewed the NPO Status.   I have reviewed the Medications.     Review of Systems  Anesthesia Hx:  No problems with previous Anesthesia  History of prior surgery of interest to airway management or planning: Denies Family Hx of Anesthesia complications.   Denies Personal Hx of Anesthesia complications.   Hematology/Oncology:  Hematology Normal   Oncology Normal     EENT/Dental:EENT/Dental Normal   Cardiovascular:   Exercise tolerance: good Hypertension    Pulmonary:  Pulmonary Normal    Renal/:  Renal/ Normal     Hepatic/GI:  Hepatic/GI Normal    Musculoskeletal:  Musculoskeletal Normal     Neurological:  Neurology Normal    Endocrine:  Endocrine Normal    Dermatological:  Skin Normal    Psych:   Psychiatric History          Physical Exam  General: Alert    Airway:  Mallampati: I / I  Mouth Opening: Normal  TM Distance: Normal  Tongue: Normal  Neck ROM: Normal ROM    Dental:  Intact        Anesthesia Plan  Type of Anesthesia, risks & benefits discussed:    Anesthesia Type: Gen Supraglottic Airway  Intra-op Monitoring Plan: Standard ASA Monitors  Post Op Pain Control Plan: multimodal analgesia and IV/PO Opioids PRN  Induction:  IV  Airway Plan: Direct  Informed Consent: Informed consent signed with the Patient and all parties understand the risks and agree with anesthesia plan.  All questions answered. Patient consented to blood products? No  ASA Score: 2  Day of Surgery Review of History & Physical: H&P Update referred to the surgeon/provider.H&P completed by Anesthesiologist.    Ready For Surgery From Anesthesia Perspective.     .

## 2023-04-17 ENCOUNTER — ANESTHESIA (OUTPATIENT)
Dept: SURGERY | Facility: HOSPITAL | Age: 68
End: 2023-04-17
Payer: MEDICARE

## 2023-04-17 ENCOUNTER — HOSPITAL ENCOUNTER (OUTPATIENT)
Facility: HOSPITAL | Age: 68
Discharge: HOME | End: 2023-04-17
Attending: SURGERY | Admitting: SURGERY
Payer: MEDICARE

## 2023-04-17 DIAGNOSIS — C44.91 BASAL CELL CARCINOMA (BCC), UNSPECIFIED SITE: ICD-10-CM

## 2023-04-17 PROCEDURE — 63600175 PHARM REV CODE 636 W HCPCS: Performed by: NURSE ANESTHETIST, CERTIFIED REGISTERED

## 2023-04-17 PROCEDURE — 36000705 HC OR TIME LEV I EA ADD 15 MIN: Performed by: SURGERY

## 2023-04-17 PROCEDURE — D9220A PRA ANESTHESIA: ICD-10-PCS | Mod: ANES,,, | Performed by: SPECIALIST

## 2023-04-17 PROCEDURE — 63600175 PHARM REV CODE 636 W HCPCS: Performed by: ANESTHESIOLOGY

## 2023-04-17 PROCEDURE — D9220A PRA ANESTHESIA: ICD-10-PCS | Mod: CRNA,,, | Performed by: NURSE ANESTHETIST, CERTIFIED REGISTERED

## 2023-04-17 PROCEDURE — D9220A PRA ANESTHESIA: Mod: CRNA,,, | Performed by: NURSE ANESTHETIST, CERTIFIED REGISTERED

## 2023-04-17 PROCEDURE — 88305 TISSUE EXAM BY PATHOLOGIST: CPT | Mod: TC | Performed by: SURGERY

## 2023-04-17 PROCEDURE — 63600175 PHARM REV CODE 636 W HCPCS: Performed by: SURGERY

## 2023-04-17 PROCEDURE — 25000003 PHARM REV CODE 250: Performed by: SURGERY

## 2023-04-17 PROCEDURE — 37000009 HC ANESTHESIA EA ADD 15 MINS: Performed by: SURGERY

## 2023-04-17 PROCEDURE — 25000003 PHARM REV CODE 250: Performed by: STUDENT IN AN ORGANIZED HEALTH CARE EDUCATION/TRAINING PROGRAM

## 2023-04-17 PROCEDURE — 11601 EXC TR-EXT MAL+MARG 0.6-1 CM: CPT | Mod: ,,, | Performed by: SURGERY

## 2023-04-17 PROCEDURE — 25000003 PHARM REV CODE 250: Performed by: NURSE ANESTHETIST, CERTIFIED REGISTERED

## 2023-04-17 PROCEDURE — 37000008 HC ANESTHESIA 1ST 15 MINUTES: Performed by: SURGERY

## 2023-04-17 PROCEDURE — 36000704 HC OR TIME LEV I 1ST 15 MIN: Performed by: SURGERY

## 2023-04-17 PROCEDURE — D9220A PRA ANESTHESIA: Mod: ANES,,, | Performed by: SPECIALIST

## 2023-04-17 PROCEDURE — 71000015 HC POSTOP RECOV 1ST HR: Performed by: SURGERY

## 2023-04-17 PROCEDURE — 71000033 HC RECOVERY, INTIAL HOUR: Performed by: SURGERY

## 2023-04-17 PROCEDURE — 11601 PR EXC SKIN MALIG 0.6-1 CM TRUNK,ARM,LEG: ICD-10-PCS | Mod: ,,, | Performed by: SURGERY

## 2023-04-17 RX ORDER — PROPOFOL 10 MG/ML
VIAL (ML) INTRAVENOUS
Status: DISCONTINUED | OUTPATIENT
Start: 2023-04-17 | End: 2023-04-17

## 2023-04-17 RX ORDER — HEPARIN SODIUM 5000 [USP'U]/ML
5000 INJECTION, SOLUTION INTRAVENOUS; SUBCUTANEOUS EVERY 8 HOURS
Status: DISCONTINUED | OUTPATIENT
Start: 2023-04-17 | End: 2023-04-17

## 2023-04-17 RX ORDER — CLINDAMYCIN PHOSPHATE 900 MG/50ML
900 INJECTION, SOLUTION INTRAVENOUS
Status: COMPLETED | OUTPATIENT
Start: 2023-04-17 | End: 2023-04-17

## 2023-04-17 RX ORDER — DIPHENHYDRAMINE HYDROCHLORIDE 50 MG/ML
25 INJECTION INTRAMUSCULAR; INTRAVENOUS ONCE AS NEEDED
Status: DISCONTINUED | OUTPATIENT
Start: 2023-04-17 | End: 2023-04-17 | Stop reason: HOSPADM

## 2023-04-17 RX ORDER — PROCHLORPERAZINE EDISYLATE 5 MG/ML
5 INJECTION INTRAMUSCULAR; INTRAVENOUS ONCE AS NEEDED
Status: DISCONTINUED | OUTPATIENT
Start: 2023-04-17 | End: 2023-04-17 | Stop reason: HOSPADM

## 2023-04-17 RX ORDER — PHENYLEPHRINE HYDROCHLORIDE 10 MG/ML
INJECTION INTRAVENOUS
Status: DISCONTINUED | OUTPATIENT
Start: 2023-04-17 | End: 2023-04-17

## 2023-04-17 RX ORDER — ONDANSETRON 2 MG/ML
4 INJECTION INTRAMUSCULAR; INTRAVENOUS ONCE
Status: DISCONTINUED | OUTPATIENT
Start: 2023-04-17 | End: 2023-04-17 | Stop reason: HOSPADM

## 2023-04-17 RX ORDER — DEXAMETHASONE SODIUM PHOSPHATE 4 MG/ML
INJECTION, SOLUTION INTRA-ARTICULAR; INTRALESIONAL; INTRAMUSCULAR; INTRAVENOUS; SOFT TISSUE
Status: DISCONTINUED | OUTPATIENT
Start: 2023-04-17 | End: 2023-04-17

## 2023-04-17 RX ORDER — BUPIVACAINE HYDROCHLORIDE 5 MG/ML
INJECTION, SOLUTION EPIDURAL; INTRACAUDAL
Status: DISCONTINUED
Start: 2023-04-17 | End: 2023-04-17 | Stop reason: WASHOUT

## 2023-04-17 RX ORDER — MEPERIDINE HYDROCHLORIDE 25 MG/ML
12.5 INJECTION INTRAMUSCULAR; INTRAVENOUS; SUBCUTANEOUS ONCE
Status: DISCONTINUED | OUTPATIENT
Start: 2023-04-17 | End: 2023-04-17 | Stop reason: HOSPADM

## 2023-04-17 RX ORDER — OXYCODONE AND ACETAMINOPHEN 5; 325 MG/1; MG/1
2 TABLET ORAL ONCE
Status: DISCONTINUED | OUTPATIENT
Start: 2023-04-17 | End: 2023-04-17 | Stop reason: HOSPADM

## 2023-04-17 RX ORDER — MIDAZOLAM HYDROCHLORIDE 1 MG/ML
INJECTION INTRAMUSCULAR; INTRAVENOUS
Status: DISCONTINUED
Start: 2023-04-17 | End: 2023-04-17 | Stop reason: HOSPADM

## 2023-04-17 RX ORDER — PROPRANOLOL HYDROCHLORIDE 20 MG/1
20 TABLET ORAL 2 TIMES DAILY
Status: DISCONTINUED | OUTPATIENT
Start: 2023-04-17 | End: 2023-04-17

## 2023-04-17 RX ORDER — HYDROMORPHONE HYDROCHLORIDE 1 MG/ML
0.2 INJECTION, SOLUTION INTRAMUSCULAR; INTRAVENOUS; SUBCUTANEOUS EVERY 5 MIN PRN
Status: DISCONTINUED | OUTPATIENT
Start: 2023-04-17 | End: 2023-04-17 | Stop reason: HOSPADM

## 2023-04-17 RX ORDER — DEXMEDETOMIDINE HYDROCHLORIDE 100 UG/ML
INJECTION, SOLUTION INTRAVENOUS
Status: DISCONTINUED | OUTPATIENT
Start: 2023-04-17 | End: 2023-04-17

## 2023-04-17 RX ORDER — FENTANYL CITRATE 50 UG/ML
INJECTION, SOLUTION INTRAMUSCULAR; INTRAVENOUS
Status: DISCONTINUED | OUTPATIENT
Start: 2023-04-17 | End: 2023-04-17

## 2023-04-17 RX ORDER — ONDANSETRON 2 MG/ML
4 INJECTION INTRAMUSCULAR; INTRAVENOUS EVERY 12 HOURS PRN
Status: DISCONTINUED | OUTPATIENT
Start: 2023-04-17 | End: 2023-04-17 | Stop reason: HOSPADM

## 2023-04-17 RX ORDER — LIDOCAINE HYDROCHLORIDE 20 MG/ML
INJECTION, SOLUTION EPIDURAL; INFILTRATION; INTRACAUDAL; PERINEURAL
Status: DISCONTINUED | OUTPATIENT
Start: 2023-04-17 | End: 2023-04-17

## 2023-04-17 RX ORDER — LIDOCAINE HCL/EPINEPHRINE/PF 2%-1:200K
VIAL (ML) INJECTION
Status: DISCONTINUED | OUTPATIENT
Start: 2023-04-17 | End: 2023-04-17 | Stop reason: HOSPADM

## 2023-04-17 RX ORDER — KETOROLAC TROMETHAMINE 30 MG/ML
INJECTION, SOLUTION INTRAMUSCULAR; INTRAVENOUS
Status: DISCONTINUED | OUTPATIENT
Start: 2023-04-17 | End: 2023-04-17

## 2023-04-17 RX ORDER — HYDROMORPHONE HYDROCHLORIDE 1 MG/ML
0.5 INJECTION, SOLUTION INTRAMUSCULAR; INTRAVENOUS; SUBCUTANEOUS EVERY 5 MIN PRN
Status: DISCONTINUED | OUTPATIENT
Start: 2023-04-17 | End: 2023-04-17 | Stop reason: HOSPADM

## 2023-04-17 RX ORDER — HYDROCODONE BITARTRATE AND ACETAMINOPHEN 5; 325 MG/1; MG/1
1 TABLET ORAL EVERY 6 HOURS PRN
Qty: 5 TABLET | Refills: 0 | Status: SHIPPED | OUTPATIENT
Start: 2023-04-17

## 2023-04-17 RX ORDER — MIDAZOLAM HYDROCHLORIDE 1 MG/ML
2 INJECTION INTRAMUSCULAR; INTRAVENOUS ONCE AS NEEDED
Status: COMPLETED | OUTPATIENT
Start: 2023-04-17 | End: 2023-04-17

## 2023-04-17 RX ORDER — HEPARIN SODIUM 5000 [USP'U]/ML
5000 INJECTION, SOLUTION INTRAVENOUS; SUBCUTANEOUS ONCE
Status: COMPLETED | OUTPATIENT
Start: 2023-04-17 | End: 2023-04-17

## 2023-04-17 RX ORDER — SODIUM CHLORIDE, SODIUM LACTATE, POTASSIUM CHLORIDE, CALCIUM CHLORIDE 600; 310; 30; 20 MG/100ML; MG/100ML; MG/100ML; MG/100ML
INJECTION, SOLUTION INTRAVENOUS CONTINUOUS
Status: ACTIVE | OUTPATIENT
Start: 2023-04-17

## 2023-04-17 RX ORDER — CLINDAMYCIN PHOSPHATE 900 MG/50ML
INJECTION, SOLUTION INTRAVENOUS
Status: DISCONTINUED
Start: 2023-04-17 | End: 2023-04-17 | Stop reason: HOSPADM

## 2023-04-17 RX ORDER — LIDOCAINE HCL/EPINEPHRINE/PF 2%-1:200K
VIAL (ML) INJECTION
Status: DISCONTINUED
Start: 2023-04-17 | End: 2023-04-17 | Stop reason: HOSPADM

## 2023-04-17 RX ORDER — SODIUM CHLORIDE 9 MG/ML
INJECTION, SOLUTION INTRAVENOUS CONTINUOUS
Status: DISCONTINUED | OUTPATIENT
Start: 2023-04-17 | End: 2023-04-17 | Stop reason: HOSPADM

## 2023-04-17 RX ORDER — ONDANSETRON 2 MG/ML
INJECTION INTRAMUSCULAR; INTRAVENOUS
Status: DISCONTINUED | OUTPATIENT
Start: 2023-04-17 | End: 2023-04-17

## 2023-04-17 RX ADMIN — DEXMEDETOMIDINE 10 MCG: 200 INJECTION, SOLUTION INTRAVENOUS at 12:04

## 2023-04-17 RX ADMIN — SODIUM CHLORIDE, POTASSIUM CHLORIDE, SODIUM LACTATE AND CALCIUM CHLORIDE: 600; 310; 30; 20 INJECTION, SOLUTION INTRAVENOUS at 11:04

## 2023-04-17 RX ADMIN — HEPARIN SODIUM 5000 UNITS: 5000 INJECTION, SOLUTION INTRAVENOUS; SUBCUTANEOUS at 09:04

## 2023-04-17 RX ADMIN — DEXAMETHASONE SODIUM PHOSPHATE 8 MG: 4 INJECTION, SOLUTION INTRA-ARTICULAR; INTRALESIONAL; INTRAMUSCULAR; INTRAVENOUS; SOFT TISSUE at 12:04

## 2023-04-17 RX ADMIN — ONDANSETRON 4 MG: 2 INJECTION INTRAMUSCULAR; INTRAVENOUS at 12:04

## 2023-04-17 RX ADMIN — CLINDAMYCIN IN 5 PERCENT DEXTROSE 900 MG: 18 INJECTION, SOLUTION INTRAVENOUS at 12:04

## 2023-04-17 RX ADMIN — MIDAZOLAM HYDROCHLORIDE 2 MG: 1 INJECTION, SOLUTION INTRAMUSCULAR; INTRAVENOUS at 11:04

## 2023-04-17 RX ADMIN — PROPRANOLOL HYDROCHLORIDE 20 MG: 20 TABLET ORAL at 10:04

## 2023-04-17 RX ADMIN — FENTANYL CITRATE 25 MCG: 50 INJECTION, SOLUTION INTRAMUSCULAR; INTRAVENOUS at 12:04

## 2023-04-17 RX ADMIN — PROPOFOL 120 MG: 10 INJECTION, EMULSION INTRAVENOUS at 12:04

## 2023-04-17 RX ADMIN — LIDOCAINE HYDROCHLORIDE 75 MG: 20 INJECTION, SOLUTION EPIDURAL; INFILTRATION; INTRACAUDAL; PERINEURAL at 12:04

## 2023-04-17 RX ADMIN — FENTANYL CITRATE 50 MCG: 50 INJECTION, SOLUTION INTRAMUSCULAR; INTRAVENOUS at 12:04

## 2023-04-17 RX ADMIN — KETOROLAC TROMETHAMINE 15 MG: 30 INJECTION, SOLUTION INTRAMUSCULAR; INTRAVENOUS at 12:04

## 2023-04-17 NOTE — TRANSFER OF CARE
Anesthesia Transfer of Care Note    Patient: Bronwyn Mistry    Procedure(s) Performed: Procedure(s) (LRB):  EXCISION, CARCINOMA, BASAL CELL RIGHT ARM (Right)    Patient location: PACU    Anesthesia Type: general    Transport from OR: Transported from OR on room air with adequate spontaneous ventilation    Post pain: adequate analgesia    Post assessment: no apparent anesthetic complications    Post vital signs: stable    Level of consciousness: sedated    Nausea/Vomiting: no nausea/vomiting    Complications: none    Transfer of care protocol was followed      Last vitals:   Visit Vitals  BP (!) 160/102   Pulse 80   Temp 36.3 °C (97.3 °F) (Temporal)   Resp 20   Wt 74.9 kg (165 lb 3.2 oz)   SpO2 100%   Breastfeeding No   BMI 28.36 kg/m²

## 2023-04-17 NOTE — DISCHARGE INSTRUCTIONS
· Keep follow up appointment  [facility].  Resume home medications unless otherwise instructed by your doctor.    · No heavy lifting or straining for 2 weeks.    · You may take a shower starting tomorrow evening. Wash GENTLY with soap and water (do not scrub) over incision, rinse with water, and pat dry.    · Do not soak your wound in water for two weeks. Do not take baths, swim, or use a hot tub until your doctor says it is okay.    · Use pain medication as instructed. Do not take Tylenol (acetaminophen) with your NORCO, since NORCO contains Tylenol as well.    · You may use an ice pack as needed for 20 minutes at a time over your incision site to minimize swelling and help relieve pain.    · Do not drink alcohol or drive today, or as long as you are on pain medication.    · Notify MD of any moderate to severe pain unrelieved by pain medicine, if your incision opens and/or bleeds, or for any signs of infection including fever above 100.4, excessive redness or swelling, yellow/green foul- smelling drainage, nausea or vomiting. Clinics number is [phone]. If it is after business hours or emergency call [phone] and state Im having post op complications and need to speak to the surgeon on call.    · Thanks for choosing [facility]! Have a smooth recovery!

## 2023-04-17 NOTE — DISCHARGE SUMMARY
[facility]  Discharge Note  Short Stay    Procedure(s) (LRB):  EXCISION, CARCINOMA, BASAL CELL RIGHT ARM (Right)      OUTCOME: Patient tolerated treatment/procedure well without complication and is now ready for discharge.    DISPOSITION: Home or Self Care    FINAL DIAGNOSIS:  <principal problem not specified>    FOLLOWUP: In clinic    DISCHARGE INSTRUCTIONS:    Discharge Procedure Orders   Diet Adult Regular     Ice to affected area     Notify your health care provider if you experience any of the following:  temperature >100.4     Notify your health care provider if you experience any of the following:  persistent nausea and vomiting or diarrhea     Notify your health care provider if you experience any of the following:  severe uncontrolled pain     Notify your health care provider if you experience any of the following:  redness, tenderness, or signs of infection (pain, swelling, redness, odor or green/yellow discharge around incision site)     No dressing needed     Activity as tolerated        TIME SPENT ON DISCHARGE: 10 minutes

## 2023-04-17 NOTE — LETTER
April 17, 2023         [address] [address]  Phone: [phone]  Fax: [phone]       Patient: Bronwyn Mistry   YOB: 1955  Date of Visit: 04/17/2023    To Whom It May Concern:    Arin Mistry  was at [facility] on 04/17/2023. The patient may return to work on 05/02/2023. If you have any questions or concerns, or if I can be of further assistance, please do not hesitate to contact me.    Sincerely,    Minal Vaughn RN

## 2023-04-17 NOTE — OP NOTE
[facility]                      [address] [place] [id number]    PATIENT NAME:      BRONWYN INFANTE   YOB: 1955  CSN:               [id number]  MRN:               [id number]  ADMIT DATE:        04/17/2023 09:20:00  PHYSICIAN:         Maco Cornejo MD                          OPERATIVE REPORT      DATE OF SURGERY:    04/17/2023 00:00:00    SURGEON:  Maco Cornejo MD    PREOPERATIVE DIAGNOSIS:  Right forearm lesion.    POSTOPERATIVE DIAGNOSIS:  Right forearm lesion.    PROCEDURES:  Excisional biopsy, 1 cm right forearm lesion.    INDICATION FOR PROCEDURE:  Bronwyn Infante is a 68-year-old female, who has   biopsy-proven cancer of the right forearm on a shave biopsy.  She presents for   definitive excision.    ANESTHESIA:  General.    COMPLICATIONS:  None.    PROCEDURE IN DETAIL:  The patient was placed under LMA anesthesia, prepped and   draped in the usual sterile fashion.  An elliptical incision was made with 4 mm   margins using a 15-blade scalpel around the lesion.  This was marked and sent to   pathology.  Hemostasis was achieved using the Bovie cautery.  Deep tissue was   closed using 3-0 Vicryl suture and a running 3-0 Monocryl used to close the   skin.  There were no complications.  I was scrubbed and present for key portions   of the procedure.        ______________________________  Maco Cornejo MD    BBF/AQS  DD:  04/17/2023  Time:  12:37PM  DT:  04/17/2023  Time:  05:21PM  Job #:  [id number]      OPERATIVE REPORT

## 2023-04-17 NOTE — INTERVAL H&P NOTE
68F PMHx breast cancer s/p lumpectomy and radiation, BCC of nose, melanoma of the LUE s/p excision, HTN presenting for bx proven BCC of the right forearm.   To OR today surgical excision       The patient has been examined and the H&P has been reviewed:    I concur with the findings and no changes have occurred since H&P was written.    Surgery risks, benefits and alternative options discussed and understood by patient/family.      There are no hospital problems to display for this patient.

## 2023-04-17 NOTE — ANESTHESIA PROCEDURE NOTES
Intubation    Date/Time: 4/17/2023 12:06 PM  Performed by: Kenneth Burrell CRNA  Authorized by: Alejandra Hudson MD     Intubation:     Induction:  Intravenous    Intubated:  Postinduction    Mask Ventilation:  Not attempted    Attempts:  1    Attempted By:  CRNA    Difficult Airway Encountered?: No      Complications:  None    Airway Device:  Supraglottic airway/LMA    Airway Device Size:  4.0    Style/Cuff Inflation:  Other (see comments) (IGEL)    Secured at:  The lips    Placement Verified By:  Capnometry    Complicating Factors:  None    Findings Post-Intubation:  BS equal bilateral and atraumatic/condition of teeth unchanged

## 2023-04-18 VITALS
TEMPERATURE: 98 F | SYSTOLIC BLOOD PRESSURE: 98 MMHG | HEART RATE: 72 BPM | OXYGEN SATURATION: 97 % | BODY MASS INDEX: 28.36 KG/M2 | DIASTOLIC BLOOD PRESSURE: 66 MMHG | WEIGHT: 165.19 LBS | RESPIRATION RATE: 18 BRPM

## 2023-04-21 NOTE — ANESTHESIA POSTPROCEDURE EVALUATION
Anesthesia Post Evaluation    Patient: Bronwyn Mistry    Procedure(s) Performed: Procedure(s) (LRB):  EXCISION, CARCINOMA, BASAL CELL RIGHT ARM (Right)    Final Anesthesia Type: general      Patient location during evaluation: PACU  Patient participation: Yes- Able to Participate  Level of consciousness: awake and responds to stimulation  Post-procedure vital signs: reviewed and stable  Pain management: adequate  Airway patency: patent    PONV status at discharge: No PONV  Anesthetic complications: no      Cardiovascular status: blood pressure returned to baseline  Respiratory status: unassisted  Hydration status: euvolemic  Follow-up not needed.          Vitals Value Taken Time   BP 98/66 04/17/23 1405   Temp 36.5 °C (97.7 °F) 04/17/23 1405   Pulse 72 04/17/23 1405   Resp 18 04/17/23 1405   SpO2 97 % 04/17/23 1405         Event Time   Out of Recovery 13:15:00         Pain/Maribel Score: No data recorded

## 2023-05-01 ENCOUNTER — OFFICE VISIT (OUTPATIENT)
Dept: PLASTIC SURGERY | Facility: CLINIC | Age: 68
End: 2023-05-01
Payer: MEDICARE

## 2023-05-01 VITALS
BODY MASS INDEX: 28.34 KG/M2 | HEART RATE: 82 BPM | WEIGHT: 166 LBS | SYSTOLIC BLOOD PRESSURE: 135 MMHG | DIASTOLIC BLOOD PRESSURE: 79 MMHG | OXYGEN SATURATION: 98 % | TEMPERATURE: 98 F | HEIGHT: 64 IN | RESPIRATION RATE: 18 BRPM

## 2023-05-01 DIAGNOSIS — C44.91 BASAL CELL CARCINOMA (BCC), UNSPECIFIED SITE: Primary | ICD-10-CM

## 2023-05-01 PROCEDURE — 1159F MED LIST DOCD IN RCRD: CPT | Mod: CPTII,,, | Performed by: SURGERY

## 2023-05-01 PROCEDURE — 1101F PR PT FALLS ASSESS DOC 0-1 FALLS W/OUT INJ PAST YR: ICD-10-PCS | Mod: CPTII,,, | Performed by: SURGERY

## 2023-05-01 PROCEDURE — 3288F PR FALLS RISK ASSESSMENT DOCUMENTED: ICD-10-PCS | Mod: CPTII,,, | Performed by: SURGERY

## 2023-05-01 PROCEDURE — 99024 PR POST-OP FOLLOW-UP VISIT: ICD-10-PCS | Mod: ,,, | Performed by: SURGERY

## 2023-05-01 PROCEDURE — 99024 POSTOP FOLLOW-UP VISIT: CPT | Mod: ,,, | Performed by: SURGERY

## 2023-05-01 PROCEDURE — 1126F AMNT PAIN NOTED NONE PRSNT: CPT | Mod: CPTII,,, | Performed by: SURGERY

## 2023-05-01 PROCEDURE — 1126F PR PAIN SEVERITY QUANTIFIED, NO PAIN PRESENT: ICD-10-PCS | Mod: CPTII,,, | Performed by: SURGERY

## 2023-05-01 PROCEDURE — 3288F FALL RISK ASSESSMENT DOCD: CPT | Mod: CPTII,,, | Performed by: SURGERY

## 2023-05-01 PROCEDURE — 3078F PR MOST RECENT DIASTOLIC BLOOD PRESSURE < 80 MM HG: ICD-10-PCS | Mod: CPTII,,, | Performed by: SURGERY

## 2023-05-01 PROCEDURE — 1159F PR MEDICATION LIST DOCUMENTED IN MEDICAL RECORD: ICD-10-PCS | Mod: CPTII,,, | Performed by: SURGERY

## 2023-05-01 PROCEDURE — 3075F SYST BP GE 130 - 139MM HG: CPT | Mod: CPTII,,, | Performed by: SURGERY

## 2023-05-01 PROCEDURE — 99215 OFFICE O/P EST HI 40 MIN: CPT | Mod: PBBFAC | Performed by: SURGERY

## 2023-05-01 PROCEDURE — 1101F PT FALLS ASSESS-DOCD LE1/YR: CPT | Mod: CPTII,,, | Performed by: SURGERY

## 2023-05-01 PROCEDURE — 3075F PR MOST RECENT SYSTOLIC BLOOD PRESS GE 130-139MM HG: ICD-10-PCS | Mod: CPTII,,, | Performed by: SURGERY

## 2023-05-01 PROCEDURE — 3008F PR BODY MASS INDEX (BMI) DOCUMENTED: ICD-10-PCS | Mod: CPTII,,, | Performed by: SURGERY

## 2023-05-01 PROCEDURE — 3078F DIAST BP <80 MM HG: CPT | Mod: CPTII,,, | Performed by: SURGERY

## 2023-05-01 PROCEDURE — 3008F BODY MASS INDEX DOCD: CPT | Mod: CPTII,,, | Performed by: SURGERY

## 2023-05-01 NOTE — PROGRESS NOTES
Surgery Clinic Note     CC: 2 week follow up left forearm excision    HPI:  68-year-old woman with a PMHx of basal cell carcinoma of the nose, melanoma of the left forearm (excised), and breast lumpectomy, 2 weeks s/p right forearm excision for biopsy proven basal cell carcinoma. Her post-op course has been uncomplicated with no bleeding, draining, pain, changes in sensation or difficulty in movement of the arm. The pathology report found no evidence of remaining carcinoma. In clinic today she noted a small red lesion in the center of her back ~1/16 inch (see media). A referral was placed to dermatology.     PMH:   Past Medical History:   Diagnosis Date    Anxiety disorder, unspecified     Depression     Hypertension     Malignant melanoma of skin, unspecified          PSH:   Past Surgical History:   Procedure Laterality Date    BASAL CELL CARCINOMA EXCISION Right 4/17/2023    Procedure: EXCISION, CARCINOMA, BASAL CELL RIGHT ARM;  Surgeon: Maco Cornejo MD;  Location: [facility];  Service: Plastics;  Laterality: Right;  right arm         Fam Hx:   Family History   Problem Relation Age of Onset    Breast cancer Sister          Social Hx:   Social History     Socioeconomic History    Marital status: Single   Tobacco Use    Smoking status: Never    Smokeless tobacco: Never   Substance and Sexual Activity    Alcohol use: Not Currently     Comment: occ    Drug use: Not Currently         Allergies:   Review of patient's allergies indicates:   Allergen Reactions    Penicillins          ROS: Negative except above     Current Outpatient Medications on File Prior to Visit   Medication Sig Dispense Refill    amitriptyline (ELAVIL) 150 MG Tab Take 150 mg by mouth every evening.      anastrozole (ARIMIDEX) 1 mg Tab Take 1 mg by mouth.      calcium carbonate (OS-SALLIE) 600 mg calcium (1,500 mg) Tab Take 1 tablet by mouth once daily.      clonazePAM (KLONOPIN) 1 MG tablet Take 1 mg by mouth 2 (two) times daily as needed.       "ergocalciferol (ERGOCALCIFEROL) 50,000 unit Cap Take 50,000 Units by mouth every 7 days.      propranoloL (INDERAL) 20 MG tablet Take 20 mg by mouth 2 (two) times daily.      rosuvastatin (CRESTOR) 10 MG tablet Take 10 mg by mouth.      venlafaxine 75 mg TR24 Take 1 tablet by mouth.      brexpiprazole (REXULTI) 2 mg Tab Take 1 tablet by mouth once daily.      HYDROcodone-acetaminophen (NORCO) 5-325 mg per tablet Take 1 tablet by mouth every 6 (six) hours as needed for Pain. (Patient not taking: Reported on 5/1/2023) 5 tablet 0    VRAYLAR 1.5 mg Cap Take 1.5 mg by mouth.       Current Facility-Administered Medications on File Prior to Visit   Medication Dose Route Frequency Provider Last Rate Last Admin    lactated ringers infusion   Intravenous Continuous Val Rios MD 10 mL/hr at 04/17/23 1113 New Bag at 04/17/23 1113       Physical Exam  /79 (BP Location: Left arm)   Pulse 82   Temp 97.9 °F (36.6 °C) (Oral)   Resp 18   Ht 5' 4" (1.626 m)   Wt 75.3 kg (166 lb)   SpO2 98%   BMI 28.49 kg/m²   General: NAD, AAO X 3  CV: Regular rate and rhythm without murmurs  Resp: Clear to ascultation bilaterally  Abdomen: soft, non-tender, non-distended, bowel sounds present  Extremities: Well healing surgical wound on right forearm, scar from prior excision on left forearm  Back: small red lesion in center of back (see media)    Surgical Pathology:   Right forearm lesion, excision:  - Site of previous biopsy present with reactive and reparative changes.    - No residual carcinoma identified.            ASSESSMENT/PLAN  68-year-old woman with a PMHx of basal cell carcinoma of the nose, melanoma of the left forearm (excised), and lumpectomy 2 weeks s/p basal cell carcinoma excision of the right forearm. Pathology revealed no remaining carcinoma. Site is healing well. She has a new lesion on her center back warranting follow up.    - follow up with dermatology for back lesion and counseled patient on the use of " sunscreen and diligent skin exams   - RTC PRN     Katja Carrington, MS3     PGY3 Attestation   I have seen and examined the patient with the medical student above. I agree with the above documentation and the note was edited as necessary.    Kacey Shea MD   [employer] General Surgery PGY 3

## 2023-09-15 ENCOUNTER — OFFICE VISIT (OUTPATIENT)
Dept: DERMATOLOGY | Facility: CLINIC | Age: 68
End: 2023-09-15
Payer: MEDICARE

## 2023-09-15 VITALS
HEART RATE: 77 BPM | SYSTOLIC BLOOD PRESSURE: 118 MMHG | DIASTOLIC BLOOD PRESSURE: 81 MMHG | RESPIRATION RATE: 18 BRPM | TEMPERATURE: 98 F | WEIGHT: 172.38 LBS | HEIGHT: 64 IN | OXYGEN SATURATION: 98 % | BODY MASS INDEX: 29.43 KG/M2

## 2023-09-15 DIAGNOSIS — C44.91 BASAL CELL CARCINOMA (BCC), UNSPECIFIED SITE: ICD-10-CM

## 2023-09-15 DIAGNOSIS — Z85.820 HX OF MALIGNANT MELANOMA: ICD-10-CM

## 2023-09-15 DIAGNOSIS — L82.1 SEBORRHEIC KERATOSES: Primary | ICD-10-CM

## 2023-09-15 PROCEDURE — 99214 OFFICE O/P EST MOD 30 MIN: CPT | Mod: PBBFAC | Performed by: DERMATOLOGY

## 2023-09-15 PROCEDURE — 17000 DESTRUCT PREMALG LESION: CPT | Mod: PBBFAC | Performed by: STUDENT IN AN ORGANIZED HEALTH CARE EDUCATION/TRAINING PROGRAM

## 2023-09-15 NOTE — PROGRESS NOTES
Chief Complaint  Follow-up      History of Present Illness  Bronwyn Mistry is a 68 y.o. female PMH of intraductal carcinoma in situ s/p lumpectomy, malignant melanoma and basal cell carcinoma on the nose s/p excision presents to [facility] for 6 month f/u and general skin eval; last seen 3/10/2023 shave bx to right forearm lesion revealed basal cell carcinoma. She s/p excisional biopsy on 4/17/2023. Hasn't notices any concerning non healing lesion. Adherent to sunprotection.       Review of Systems   Constitutional:  Negative for fever.   Cardiovascular:  Negative for chest pain.         Physical Exam    Vitals:    09/15/23 0915   BP: 118/81   Pulse: 77   Resp: 18   Temp: 98.2 °F (36.8 °C)     Wt Readings from Last 2 Encounters:   09/15/23 78.2 kg (172 lb 6.4 oz)   05/01/23 75.3 kg (166 lb)   General: NAD  Integumentary: Warm and dry. Several well circumscribed brown macules on face, neck, chest, back and extremities consistent with solar lentigo. Upper back with  raised SKs, folliculitis, and cyst right upper back. SKs on bilateral lower extremities.   Left inferior gluteal fold buttock raised SK. Well healed post surgical scars on bilateral forearms and nasal bridge.    Procedure: Cryotherapy of seborrheic keratosis left inferior gluteal region    Consent: Risks and benefits of therapy discussed with patient who voices understanding and agrees with planned care. No barriers to communication or understanding identified. After obtaining informed consent, the patient's identity, procedure, and site were verified during a pause prior to proceeding with the minor surgical procedure as per universal protocol recommendations.     SK was treated with cryotherapy with freeze thaw freeze technique with 2-3 mm surround freeze for a total of 3 cycles.          Current Outpatient Medications  Current Outpatient Medications   Medication Instructions    amitriptyline (ELAVIL) 150 mg, Oral, Nightly    anastrozole (ARIMIDEX)  1 mg, Oral    brexpiprazole (REXULTI) 2 mg Tab 1 tablet, Oral, Daily    calcium carbonate (OS-SALLIE) 600 mg calcium (1,500 mg) Tab 1 tablet, Oral, Daily    clonazePAM (KLONOPIN) 1 mg, Oral, 2 times daily PRN    ergocalciferol (ERGOCALCIFEROL) 50,000 Units, Oral, Every 7 days    HYDROcodone-acetaminophen (NORCO) 5-325 mg per tablet 1 tablet, Oral, Every 6 hours PRN    propranoloL (INDERAL) 20 mg, Oral, 2 times daily    rosuvastatin (CRESTOR) 10 mg, Oral    venlafaxine 75 mg TR24 1 tablet, Oral    VRAYLAR 1.5 mg, Oral             Assessment / Plan:  Seborrheic keratoses  Hx Basal cell carcinoma (BCC), unspecified site  Hx of malignant melanoma  General skin evaluation revealed no concerning lesions  Tolerated cryo to SK to left inferior gluteal area without immediate complications  After care instructions provided  Return to clinic in 1 year for general skin evaluation          Follow up:    In 1 year, or earlier if needed.     Eleonora Laura MD  [employer]  PGY-3

## 2023-12-12 ENCOUNTER — OFFICE VISIT (OUTPATIENT)
Dept: OPHTHALMOLOGY | Facility: CLINIC | Age: 68
End: 2023-12-12
Payer: MEDICARE

## 2023-12-12 VITALS — WEIGHT: 172.38 LBS | BODY MASS INDEX: 29.43 KG/M2 | HEIGHT: 64 IN

## 2023-12-12 DIAGNOSIS — Z85.820 H/O MALIGNANT MELANOMA: Primary | ICD-10-CM

## 2023-12-12 DIAGNOSIS — H52.203 MYOPIA OF BOTH EYES WITH ASTIGMATISM: ICD-10-CM

## 2023-12-12 DIAGNOSIS — H52.13 MYOPIA OF BOTH EYES WITH ASTIGMATISM: ICD-10-CM

## 2023-12-12 PROCEDURE — 92250 FUNDUS PHOTOGRAPHY W/I&R: CPT | Mod: PBBFAC,PN | Performed by: STUDENT IN AN ORGANIZED HEALTH CARE EDUCATION/TRAINING PROGRAM

## 2023-12-12 PROCEDURE — 99213 OFFICE O/P EST LOW 20 MIN: CPT | Mod: PBBFAC,PN | Performed by: STUDENT IN AN ORGANIZED HEALTH CARE EDUCATION/TRAINING PROGRAM

## 2023-12-12 PROCEDURE — 92250 FUNDUS PHOTOGRAPHY W/I&R: CPT | Mod: PBBFAC,PN | Performed by: OPHTHALMOLOGY

## 2023-12-12 NOTE — PROGRESS NOTES
HPI    1 year or sooner for MRx  Last edited by Debora Rider MA on 12/12/2023  2:25 PM.            Assessment /Plan     For exam results, see Encounter Report.    H/O Malignant melanoma    Myopia of both eyes with astigmatism        1. Hx of melanoma and nasal BCC w flap  - referred 12/2022 for eye exam to check for choroidal melanoma  - DFE WNL  - fundus photos 12/12/23     2. NSC, OU  - NVS  - MRX provided 12/12/23, BCVA 20/20      RTC 1 year or sooner PRN

## 2024-06-12 DIAGNOSIS — M25.562 BILATERAL KNEE PAIN: Primary | ICD-10-CM

## 2024-06-12 DIAGNOSIS — M25.561 BILATERAL KNEE PAIN: Primary | ICD-10-CM

## 2024-09-18 DIAGNOSIS — M25.561 BILATERAL KNEE PAIN: Primary | ICD-10-CM

## 2024-09-18 DIAGNOSIS — M25.562 BILATERAL KNEE PAIN: Primary | ICD-10-CM

## 2024-10-04 ENCOUNTER — OFFICE VISIT (OUTPATIENT)
Dept: DERMATOLOGY | Facility: CLINIC | Age: 69
End: 2024-10-04
Payer: MEDICARE

## 2024-10-04 VITALS
OXYGEN SATURATION: 98 % | TEMPERATURE: 98 F | WEIGHT: 169.81 LBS | DIASTOLIC BLOOD PRESSURE: 79 MMHG | SYSTOLIC BLOOD PRESSURE: 136 MMHG | HEART RATE: 78 BPM | BODY MASS INDEX: 28.99 KG/M2 | HEIGHT: 64 IN

## 2024-10-04 DIAGNOSIS — Z85.828 HISTORY OF BASAL CELL CANCER: Primary | ICD-10-CM

## 2024-10-04 DIAGNOSIS — Z85.820 H/O MALIGNANT MELANOMA: ICD-10-CM

## 2024-10-04 DIAGNOSIS — L82.1 SEBORRHEIC KERATOSES: ICD-10-CM

## 2024-10-04 PROCEDURE — 99213 OFFICE O/P EST LOW 20 MIN: CPT | Mod: PBBFAC | Performed by: DERMATOLOGY

## 2024-10-04 RX ORDER — LIDOCAINE HYDROCHLORIDE 10 MG/ML
10 INJECTION, SOLUTION INFILTRATION; PERINEURAL
Status: DISCONTINUED | OUTPATIENT
Start: 2024-10-04 | End: 2024-10-04

## 2024-10-04 RX ORDER — LIDOCAINE HYDROCHLORIDE 10 MG/ML
5 INJECTION, SOLUTION EPIDURAL; INFILTRATION; INTRACAUDAL; PERINEURAL ONCE
Qty: 5 ML | Refills: 0 | Status: SHIPPED | OUTPATIENT
Start: 2024-10-04 | End: 2024-10-04 | Stop reason: CLARIF

## 2024-10-04 RX ORDER — LIDOCAINE HYDROCHLORIDE 10 MG/ML
20 INJECTION, SOLUTION INFILTRATION; PERINEURAL
Status: DISCONTINUED | OUTPATIENT
Start: 2024-10-04 | End: 2024-10-04

## 2024-10-04 RX ORDER — LIDOCAINE HYDROCHLORIDE 10 MG/ML
5 INJECTION, SOLUTION INFILTRATION; PERINEURAL
Status: DISPENSED | OUTPATIENT
Start: 2024-10-04

## 2024-10-04 NOTE — PROGRESS NOTES
Chief Complaint  Follow-up (RTC 1 year general skin exam)      History of Present Illness  Bronwyn Mistry is a 69 y.o. female PMH of intraductal carcinoma in situ s/p lumpectomy and post op RT 4/18-5/13/2022 , malignant melanoma - 2009 and basal cell carcinoma on the nose and right forearm s/p excision in 2023 presents to [facility] for yearly f/u and general skin eval.     Today, patient c/o of lesion under her chin and on her left flank area that is pruritic. Also new spot above her left eyelid. She also mentions of lesions on bilateral legs and back, sporadic that has possibly grown in size and numbers. C/o one hyperpigmented and pedunculated lesion on her anterior chest for 1-2 weeks. Has not used any topical treatment yet. Patient has been avoiding sun light as much as possible.       Review of Systems   Constitutional:  Negative for fever.   Cardiovascular:  Negative for chest pain.         Physical Exam    Vitals:    10/04/24 0956   BP: 136/79   Pulse: 78   Temp: 97.7 °F (36.5 °C)     Wt Readings from Last 2 Encounters:   10/04/24 77 kg (169 lb 12.8 oz)   12/12/23 78.2 kg (172 lb 6.4 oz)     General: NAD  Integumentary: Warm and dry. Several well circumscribed brown macules on face, neck, chest, back and extremities. Upper back with  raised SKs, folliculitis, and cyst right upper back. SKs on bilateral lower extremities. Raised lesion on the upper left eye lid  Well healed post surgical scars on bilateral forearms and nasal bridge.                          Current Outpatient Medications  Current Outpatient Medications   Medication Instructions    amitriptyline (ELAVIL) 150 mg, Oral, Nightly    anastrozole (ARIMIDEX) 1 mg, Oral    brexpiprazole (REXULTI) 2 mg Tab 1 tablet, Oral, Daily    calcium carbonate (OS-SALLIE) 600 mg calcium (1,500 mg) Tab 1 tablet, Oral, Daily    clonazePAM (KLONOPIN) 1 mg, Oral, 2 times daily PRN    ergocalciferol (ERGOCALCIFEROL) 50,000 Units, Oral, Every 7 days     HYDROcodone-acetaminophen (NORCO) 5-325 mg per tablet 1 tablet, Oral, Every 6 hours PRN    propranoloL (INDERAL) 20 mg, Oral, 2 times daily    rosuvastatin (CRESTOR) 10 mg, Oral    venlafaxine 75 mg TR24 1 tablet, Oral    VRAYLAR 1.5 mg, Oral         Assessment / Plan:  Seborrheic keratoses  Hx Basal cell carcinoma s/p excision (R forearm and nose)  Hx of malignant melanoma (L forearm-2009)  Skin lesion  left upper eye lid  Solar lentigo  - plan for shave biopsy today of the lesion on the left forehead  - Cryotherapy for raised skin lesion on the anterior chest  - continue sun protection    Procedure: Cryotherapy   Location: Anterior chest  Indication: relief of irritation/pain  Physicians: Dr. Luis Manuel Ellis  Attending: Dr. Barakat   Procedure, benefits, risks (including those of bleeding, infection, anesthesia & allergic reaction), and alternatives explained to the patient who voiced understanding of the information. Patient agreed to proceed with cryotherapy. Informed consent signed and on chart.   Description: Cryotip nozzle with liquid nitrogen held approximately 1 cm away from lesion. Liquid nitrogen was applied 5 seconds to the lesion. Lesion was allowed to thaw, before being reapplied for 5 seconds. No complications. Patient tolerated procedure well. Skin care precautions given.   Disposition: Patient tolerated procedure well with no complications. Patient instructed on wound care management.    Procedure: Shave Biopsy of Skin lesion  Location: Forehead  Indication: Pain/irritation relief  Performed by Attending Physician: Dr. Barakat  Procedure, benefits, risks (including those of bleeding, infection, anesthesia & allergic reaction), and alternatives explained to the patient who voiced understanding of the information. Patient agreed to proceed with shave of skin lesion.  Anesthesia: Lidocaine 1% without epi  Description: Time out performed - patient, procedure, side & site confirmed. Skin prepped with  alcohol pads. Dermablade used to shave down skin lesion. Superficial tissue was removed. Estimated blood loss: None   Pathology specimen collected and sent.  Disposition: Patient tolerated procedure well with no complications. Patient instructed on wound care management.      Follow up:    In 1 year, or earlier if needed.     Luis Manuel Ellis MD  Internal Medicine - PGY-2

## 2024-10-14 ENCOUNTER — OFFICE VISIT (OUTPATIENT)
Dept: ORTHOPEDICS | Facility: CLINIC | Age: 69
End: 2024-10-14
Payer: MEDICARE

## 2024-10-14 ENCOUNTER — HOSPITAL ENCOUNTER (OUTPATIENT)
Dept: RADIOLOGY | Facility: CLINIC | Age: 69
Discharge: HOME | End: 2024-10-14
Attending: REHABILITATION UNIT
Payer: MEDICARE

## 2024-10-14 VITALS
BODY MASS INDEX: 28.98 KG/M2 | WEIGHT: 169.75 LBS | DIASTOLIC BLOOD PRESSURE: 86 MMHG | HEART RATE: 71 BPM | SYSTOLIC BLOOD PRESSURE: 137 MMHG | HEIGHT: 64 IN

## 2024-10-14 DIAGNOSIS — M25.561 BILATERAL KNEE PAIN: ICD-10-CM

## 2024-10-14 DIAGNOSIS — M25.562 BILATERAL KNEE PAIN: ICD-10-CM

## 2024-10-14 DIAGNOSIS — M17.0 BILATERAL PRIMARY OSTEOARTHRITIS OF KNEE: Primary | ICD-10-CM

## 2024-10-14 PROCEDURE — 3079F DIAST BP 80-89 MM HG: CPT | Mod: CPTII,,, | Performed by: REHABILITATION UNIT

## 2024-10-14 PROCEDURE — 3288F FALL RISK ASSESSMENT DOCD: CPT | Mod: CPTII,,, | Performed by: REHABILITATION UNIT

## 2024-10-14 PROCEDURE — 73564 X-RAY EXAM KNEE 4 OR MORE: CPT | Mod: 50,,, | Performed by: REHABILITATION UNIT

## 2024-10-14 PROCEDURE — 20610 DRAIN/INJ JOINT/BURSA W/O US: CPT | Mod: RT,,, | Performed by: REHABILITATION UNIT

## 2024-10-14 PROCEDURE — 99204 OFFICE O/P NEW MOD 45 MIN: CPT | Mod: 25,,, | Performed by: REHABILITATION UNIT

## 2024-10-14 PROCEDURE — 1100F PTFALLS ASSESS-DOCD GE2>/YR: CPT | Mod: CPTII,,, | Performed by: REHABILITATION UNIT

## 2024-10-14 PROCEDURE — 3075F SYST BP GE 130 - 139MM HG: CPT | Mod: CPTII,,, | Performed by: REHABILITATION UNIT

## 2024-10-14 RX ADMIN — BETAMETHASONE SODIUM PHOSPHATE AND BETAMETHASONE ACETATE 12 MG: 3; 3 INJECTION, SUSPENSION INTRA-ARTICULAR; INTRALESIONAL; INTRAMUSCULAR; SOFT TISSUE at 09:10

## 2024-10-14 RX ADMIN — LIDOCAINE HYDROCHLORIDE 2 ML: 10 INJECTION, SOLUTION INFILTRATION; PERINEURAL at 09:10

## 2024-10-14 NOTE — PROGRESS NOTES
Subjective:      Patient ID: Bronwyn Mistry is a 69 y.o. female.    Chief Complaint: Knee Pain (Godwin knee pain- Stated rt knee is worse than lt knee. Reports swelling in godwin knees for years. Is taking tylenol for pain but does not help. Stated knees feel like they will give out at times. Has had cortisone injections before about a year and half ago. Stated has had 4 falls due to knee giving out. )    HPI:   Bronwyn Mistry is a 69 y.o. female who presents today for initial evaluation of her bilateral knees     History of Present Illness  The patient presents for evaluation of bilateral knee pain.      She has been experiencing persistent discomfort in her right knee for several years. In the past, she received injections in both knees, which included a gel injection and possibly cortisone. She also had fluid drained from her left knee. Despite having mobility aids at home, she chooses not to use them. She was prescribed meloxicam for her knee pain, but it did not provide any relief. She has not consulted an orthopedic specialist for her knee issues. Occasionally, she experiences creaking and crunching sensations in her knees.    Her overall health is stable.    Past Medical History:   Diagnosis Date    Anxiety disorder, unspecified     Depression     Hypertension     Malignant melanoma of skin, unspecified      Past Surgical History:   Procedure Laterality Date    BASAL CELL CARCINOMA EXCISION Right 4/17/2023    Procedure: EXCISION, CARCINOMA, BASAL CELL RIGHT ARM;  Surgeon: Maco Cornejo MD;  Location: [facility];  Service: Plastics;  Laterality: Right;  right arm     Social History     Socioeconomic History    Marital status: Single   Tobacco Use    Smoking status: Never    Smokeless tobacco: Never   Substance and Sexual Activity    Alcohol use: Not Currently     Comment: occ    Drug use: Not Currently         Current Outpatient Medications:     amitriptyline (ELAVIL) 150 MG Tab, Take 150 mg by mouth every  "evening., Disp: , Rfl:     anastrozole (ARIMIDEX) 1 mg Tab, Take 1 mg by mouth., Disp: , Rfl:     calcium carbonate (OS-SALLIE) 600 mg calcium (1,500 mg) Tab, Take 1 tablet by mouth once daily., Disp: , Rfl:     clonazePAM (KLONOPIN) 1 MG tablet, Take 1 mg by mouth 2 (two) times daily as needed., Disp: , Rfl:     propranoloL (INDERAL) 20 MG tablet, Take 20 mg by mouth 2 (two) times daily., Disp: , Rfl:     rosuvastatin (CRESTOR) 10 MG tablet, Take 10 mg by mouth., Disp: , Rfl:     brexpiprazole (REXULTI) 2 mg Tab, Take 1 tablet by mouth once daily., Disp: , Rfl:     ergocalciferol (ERGOCALCIFEROL) 50,000 unit Cap, Take 50,000 Units by mouth every 7 days., Disp: , Rfl:     HYDROcodone-acetaminophen (NORCO) 5-325 mg per tablet, Take 1 tablet by mouth every 6 (six) hours as needed for Pain. (Patient not taking: Reported on 10/24/2024), Disp: 5 tablet, Rfl: 0    venlafaxine 75 mg TR24, Take 1 tablet by mouth., Disp: , Rfl:     VRAYLAR 1.5 mg Cap, Take 1.5 mg by mouth. (Patient not taking: Reported on 10/24/2024), Disp: , Rfl:     Current Facility-Administered Medications:     LIDOcaine HCL 10 mg/ml (1%) injection 5 mL, 5 mL, Intradermal, 1 time in Clinic/HOD,     Facility-Administered Medications Ordered in Other Visits:     lactated ringers infusion, , Intravenous, Continuous, Val Rios MD, Last Rate: 10 mL/hr at 04/17/23 1113, New Bag at 04/17/23 1113  Review of patient's allergies indicates:   Allergen Reactions    Penicillins        /86   Pulse 71   Ht 5' 4" (1.626 m)   Wt 77 kg (169 lb 12.1 oz)   BMI 29.14 kg/m²     Comprehensive review of systems completed and negative except as per HPI.        Objective:   Head: Normocephalic, without obvious abnormality, atraumatic  Eyes: conjunctivae/corneas clear. EOM's intact  Ears: normal external appearance  Nose: Nares normal. Septum midline. Mucosa normal. No drainage  Throat: normal findings: lips normal without lesions  Lungs: unlabored breathing on " room air  Chest wall: symmetric chest rise  Heart: regular rate and rhythm  Pulses: 2+ and symmetric  Skin: Skin color, texture, turgor normal. No rashes or lesions  Neurologic: Grossly normal    bilateral KNEE:     Alignment:  Valgus to the right knee and more neutral to the left  Appearance: skin is intact without lesion  Effusion: small   Gait: antalgic  Straight Leg Raise: negative  Log Roll: negative  Hip ROM: full and painless  Ankle ROM: full and painless   Knee ROM:  3 - 115  Tenderness:  Diffuse TTP maximal lateral joint line to the right knee  Patellar Mobility: decreased  Patellar grind: +  PF Crepitus: +        Valgus Stress @ 0 deg: stable  Valgus Stress @ 30 deg: stable  Varus Stress @ 0 deg: stable  Varus Stress @ 30 deg: stable  Lachman: stable  Ant Drawer: negative   Post Drawer: negative  Posterior Sag Sign: negative  Neurological deficits: SILT dp/sp/t distributions  Motor: 5/5 EHL/FHL/TA/GS    Warm well perfused lower extremity with capillary refill less than 2 seconds and sensation intact to light touch in the terminal nerve distributions. Calf soft and easily compressible without clinical sign of DVT. No palpable popliteal lymphadenopathy    Assessment:     Imaging:   Four view radiographs of the bilateral knees obtained today personally reviewed showing advanced osteoarthritis.  She was valgus deformity of the right knee with bone-on-bone contact of the lateral compartment.  Surrounding osteophyte formation and subchondral sclerosis.  She also has a advanced arthritic changes to the left knee more so in the medial compartment with joint space narrowing.    Large Joint Aspiration/Injection: R knee    Date/Time: 10/14/2024 9:15 AM    Performed by: Ramakrishna Palacios MD  Authorized by: Ramakrishna Palacios MD    Consent Done?:  Yes (Verbal)  Indications:  Arthritis and pain  Site marked: the procedure site was marked    Timeout: prior to procedure the correct patient, procedure, and site was verified     Prep: patient was prepped and draped in usual sterile fashion    Local anesthesia used?: No      Details:  Needle Size:  22 G  Ultrasonic Guidance for needle placement?: No    Approach:  Anterolateral  Location:  Knee  Site:  R knee  Medications:  2 mL LIDOcaine HCL 10 mg/ml (1%) 10 mg/mL (1 %); 12 mg betamethasone acetate-betamethasone sodium phosphate 6 mg/mL  Patient tolerance:  Patient tolerated the procedure well with no immediate complications          1. Bilateral primary osteoarthritis of knee    2. Bilateral knee pain          Plan:       Orders Placed This Encounter    X-Ray Knee Complete 4 Or More Views Bilat        Imaging and exam findings discussed.     Assessment & Plan  1. Right knee osteoarthritis.  Severe arthritis is present in the right knee, with bone-on-bone contact of the lateral compartment and valgus malalignment. The patient has previously received gel and possibly cortisone shots, which provided limited relief. A steroid injection will be administered today for pain management until the arthroplasty consultation. A referral will be made to 1 of my arthroplasty colleagues for potential knee replacement surgery.  In the interim she will avoid aggravating activities.  Manage symptomatically.    2. Left knee osteoarthritis.  The left knee also shows signs of advanced arthritis. The patient has received fluid aspiration and gel shots in the past. No immediate intervention is planned for the left knee at this time.    All questions were answered. Patient happy and in agreement with the plan.     This note was generated with the assistance of ambient listening technology. Verbal consent was obtained by the patient and accompanying visitor(s) for the recording of patient appointment to facilitate this note. I attest to having reviewed and edited the generated note for accuracy, though some syntax or spelling errors may persist. Please contact the author of this note for any clarification.

## 2024-10-24 ENCOUNTER — OFFICE VISIT (OUTPATIENT)
Dept: ORTHOPEDICS | Facility: CLINIC | Age: 69
End: 2024-10-24
Payer: MEDICARE

## 2024-10-24 VITALS
SYSTOLIC BLOOD PRESSURE: 132 MMHG | DIASTOLIC BLOOD PRESSURE: 88 MMHG | HEIGHT: 64 IN | BODY MASS INDEX: 28.98 KG/M2 | WEIGHT: 169.75 LBS | HEART RATE: 98 BPM

## 2024-10-24 DIAGNOSIS — M17.11 PRIMARY OSTEOARTHRITIS OF RIGHT KNEE: Primary | ICD-10-CM

## 2024-10-24 NOTE — PROGRESS NOTES
Chief Complaint:   Chief Complaint   Patient presents with    Right Knee - Pain     Right knee pain starting approx 6 years ago after fall down stairs. Cortisone inj with Dr. Palacios with no relief. Ambulates without assistive devices. Reports fall this year as well.        History of present illness:    History of Present Illness  The patient is a 69-year-old male who presents for evaluation of right knee pain. He is accompanied by an adult female.    He has been experiencing right knee pain for approximately a year, which has significantly impaired his ability to walk. Despite taking Tylenol, the pain persists. He received an injection last week, but it did not alleviate his discomfort. He was informed that fluid would be drained from his knee, but this procedure was not performed. He consulted with a surgeon who warned him of potential fatal risks associated with surgery. He has experienced three falls due to his knee instability. He has not tried gel injections for his knee. He also reports swelling in his feet and a noticeable crookedness in his leg.    Past Medical History:   Diagnosis Date    Anxiety disorder, unspecified     Depression     Hypertension     Malignant melanoma of skin, unspecified        Past Surgical History:   Procedure Laterality Date    BASAL CELL CARCINOMA EXCISION Right 4/17/2023    Procedure: EXCISION, CARCINOMA, BASAL CELL RIGHT ARM;  Surgeon: Maco Cornejo MD;  Location: [facility];  Service: Plastics;  Laterality: Right;  right arm       Current Outpatient Medications   Medication Sig    amitriptyline (ELAVIL) 150 MG Tab Take 150 mg by mouth every evening.    anastrozole (ARIMIDEX) 1 mg Tab Take 1 mg by mouth.    brexpiprazole (REXULTI) 2 mg Tab Take 1 tablet by mouth once daily.    calcium carbonate (OS-SALLIE) 600 mg calcium (1,500 mg) Tab Take 1 tablet by mouth once daily.    clonazePAM (KLONOPIN) 1 MG tablet Take 1 mg by mouth 2 (two) times daily as needed.    ergocalciferol  (ERGOCALCIFEROL) 50,000 unit Cap Take 50,000 Units by mouth every 7 days.    propranoloL (INDERAL) 20 MG tablet Take 20 mg by mouth 2 (two) times daily.    rosuvastatin (CRESTOR) 10 MG tablet Take 10 mg by mouth.    venlafaxine 75 mg TR24 Take 1 tablet by mouth.    HYDROcodone-acetaminophen (NORCO) 5-325 mg per tablet Take 1 tablet by mouth every 6 (six) hours as needed for Pain. (Patient not taking: Reported on 10/24/2024)    VRAYLAR 1.5 mg Cap Take 1.5 mg by mouth. (Patient not taking: Reported on 10/24/2024)     Current Facility-Administered Medications   Medication    LIDOcaine HCL 10 mg/ml (1%) injection 5 mL     Facility-Administered Medications Ordered in Other Visits   Medication    lactated ringers infusion       Review of patient's allergies indicates:   Allergen Reactions    Penicillins        Family History   Problem Relation Name Age of Onset    Breast cancer Sister         Social History     Socioeconomic History    Marital status: Single   Tobacco Use    Smoking status: Never    Smokeless tobacco: Never   Substance and Sexual Activity    Alcohol use: Not Currently     Comment: occ    Drug use: Not Currently           Review of Systems:    Constitution: Negative for chills, fever, and sweats.  Negative for unexplained weight loss.    HENT:  Negative for headaches and blurry vision.    Cardiovascular:Negative for chest pain or irregular heart beat. Negative for hypertension.    Respiratory:  Negative for cough and shortness of breath.    Gastrointestinal: Negative for abdominal pain, heartburn, melena, nausea, and vomitting.    Genitourinary:  Negative bladder incontinence and dysuria.    Musculoskeletal:  See HPI    Neurological: Negative for numbness.    Psychiatric/Behavioral: Negative for depression.  The patient is not nervous/anxious.      Endocrine: Negative for polyuria    Hematologic/Lymphatic: Negative for bleeding problem.  Does not bruise/bleed easily.    Skin: Negative for poor would  healing and rash      Physical Examination:    Vital Signs:    Vitals:    10/24/24 1442   BP: 132/88   Pulse: 98       Body mass index is 29.14 kg/m².    General: No acute distress, alert and oriented, healthy appearing    HEENT: Head is atraumatic, mucous membranes are moist    Neck: Supples, no JVD    Cardiovascular: Palpable dorsalis pedis and posterior tibial pulses, regular rate and rhythm to those pulses    Lungs: Breathing non-labored    Skin: no rashes appreciated    Neurologic: Can flex and extend knees, ankles, and toes. Sensation is grossly intact    Right knee:  Crepitus range of motion.  Patient was valgus alignment.  She gets extension full.  Flexion of 110°.  Valgus is correctable nearly to neutral.    X-rays:  Three views right knee reviewed.  Patient with end-stage osteoarthritis of the right knee.  She has bone-on-bone articulation of the lateral joint line and patellofemoral joint.  Kellgren Toi grade 4 changes of the right knee noted.     Assessment::  Right knee osteoarthritis    Plan:  Discussed all treatment with the patient.  Patient was end-stage osteoarthritis to the right knee.  She has tried cortisone injection.  It was failed to relieve her symptoms for any length of time.  We will try to get her approved for Synvisc and see her back for this injection.    This note was generated with the assistance of ambient listening technology. Verbal consent was obtained by the patient and accompanying visitor(s) for the recording of patient appointment to facilitate this note. I attest to having reviewed and edited the generated note for accuracy, though some syntax or spelling errors may persist. Please contact the author of this note for any clarification.      This note was created using Denator voice recognition software that occasionally misinterpreted phrases or words.    Consult note is delivered via Epic messaging service.

## 2024-11-04 RX ORDER — LIDOCAINE HYDROCHLORIDE 10 MG/ML
2 INJECTION, SOLUTION INFILTRATION; PERINEURAL
Status: DISCONTINUED | OUTPATIENT
Start: 2024-10-14 | End: 2024-11-04 | Stop reason: HOSPADM

## 2024-11-04 RX ORDER — BETAMETHASONE SODIUM PHOSPHATE AND BETAMETHASONE ACETATE 3; 3 MG/ML; MG/ML
12 INJECTION, SUSPENSION INTRA-ARTICULAR; INTRALESIONAL; INTRAMUSCULAR; SOFT TISSUE
Status: DISCONTINUED | OUTPATIENT
Start: 2024-10-14 | End: 2024-11-04 | Stop reason: HOSPADM

## 2024-11-14 ENCOUNTER — OFFICE VISIT (OUTPATIENT)
Dept: ORTHOPEDICS | Facility: CLINIC | Age: 69
End: 2024-11-14
Payer: MEDICARE

## 2024-11-14 VITALS
BODY MASS INDEX: 29.37 KG/M2 | DIASTOLIC BLOOD PRESSURE: 74 MMHG | SYSTOLIC BLOOD PRESSURE: 119 MMHG | WEIGHT: 172 LBS | HEIGHT: 64 IN | HEART RATE: 76 BPM

## 2024-11-14 DIAGNOSIS — M17.11 PRIMARY OSTEOARTHRITIS OF RIGHT KNEE: Primary | ICD-10-CM

## 2024-11-14 NOTE — PROGRESS NOTES
Chief Complaint:   Chief Complaint   Patient presents with    Right Knee - Injections     Rt knee synvisc.        History of present illness:  69-year-old female presents today for evaluation follow-up of right knee pain.  Patient was here today for Synvisc injection.    History of Present Illness      Past Medical History:   Diagnosis Date    Anxiety disorder, unspecified     Depression     Hypertension     Malignant melanoma of skin, unspecified        Past Surgical History:   Procedure Laterality Date    BASAL CELL CARCINOMA EXCISION Right 4/17/2023    Procedure: EXCISION, CARCINOMA, BASAL CELL RIGHT ARM;  Surgeon: Maco Cornejo MD;  Location: [facility];  Service: Plastics;  Laterality: Right;  right arm       Current Outpatient Medications   Medication Sig    amitriptyline (ELAVIL) 150 MG Tab Take 150 mg by mouth every evening.    anastrozole (ARIMIDEX) 1 mg Tab Take 1 mg by mouth.    brexpiprazole (REXULTI) 2 mg Tab Take 1 tablet by mouth once daily.    calcium carbonate (OS-SALLIE) 600 mg calcium (1,500 mg) Tab Take 1 tablet by mouth once daily.    clonazePAM (KLONOPIN) 1 MG tablet Take 1 mg by mouth 2 (two) times daily as needed.    ergocalciferol (ERGOCALCIFEROL) 50,000 unit Cap Take 50,000 Units by mouth every 7 days.    HYDROcodone-acetaminophen (NORCO) 5-325 mg per tablet Take 1 tablet by mouth every 6 (six) hours as needed for Pain.    propranoloL (INDERAL) 20 MG tablet Take 20 mg by mouth 2 (two) times daily.    rosuvastatin (CRESTOR) 10 MG tablet Take 10 mg by mouth.    venlafaxine 75 mg TR24 Take 1 tablet by mouth.    VRAYLAR 1.5 mg Cap Take 1.5 mg by mouth.     Current Facility-Administered Medications   Medication    LIDOcaine HCL 10 mg/ml (1%) injection 5 mL     Facility-Administered Medications Ordered in Other Visits   Medication    lactated ringers infusion       Review of patient's allergies indicates:   Allergen Reactions    Penicillins        Family History   Problem Relation Name Age of  Onset    Breast cancer Sister         Social History     Socioeconomic History    Marital status: Single   Tobacco Use    Smoking status: Never    Smokeless tobacco: Never   Substance and Sexual Activity    Alcohol use: Not Currently     Comment: occ    Drug use: Not Currently           Review of Systems:    Constitution: Negative for chills, fever, and sweats.  Negative for unexplained weight loss.    HENT:  Negative for headaches and blurry vision.    Cardiovascular:Negative for chest pain or irregular heart beat. Negative for hypertension.    Respiratory:  Negative for cough and shortness of breath.    Gastrointestinal: Negative for abdominal pain, heartburn, melena, nausea, and vomitting.    Genitourinary:  Negative bladder incontinence and dysuria.    Musculoskeletal:  See HPI    Neurological: Negative for numbness.    Psychiatric/Behavioral: Negative for depression.  The patient is not nervous/anxious.      Endocrine: Negative for polyuria    Hematologic/Lymphatic: Negative for bleeding problem.  Does not bruise/bleed easily.    Skin: Negative for poor would healing and rash      Physical Examination:    Vital Signs:    Vitals:    11/14/24 1456   BP: 119/74   Pulse: 76       Body mass index is 29.52 kg/m².    General: No acute distress, alert and oriented, healthy appearing    HEENT: Head is atraumatic, mucous membranes are moist    Neck: Supples, no JVD    Cardiovascular: Palpable dorsalis pedis and posterior tibial pulses, regular rate and rhythm to those pulses    Lungs: Breathing non-labored    Skin: no rashes appreciated    Neurologic: Can flex and extend knees, ankles, and toes. Sensation is grossly intact    Right knee:  Patient was mild crepitus range of motion.  Brisk cap refill disappeared sensation intact distally.    X-rays:  Four views of the right knee reviewed.  Patient was osteoarthritic change of the right knee     Assessment::  Right knee osteoarthritis    Plan:  Discussed all treatment  options the patient.  Do a Synvisc injections today.  See her back in a couple of months.    This note was generated with the assistance of ambient listening technology. Verbal consent was obtained by the patient and accompanying visitor(s) for the recording of patient appointment to facilitate this note. I attest to having reviewed and edited the generated note for accuracy, though some syntax or spelling errors may persist. Please contact the author of this note for any clarification.      This note was created using [employer] voice recognition software that occasionally misinterpreted phrases or words.    Consult note is delivered via Epic messaging service.

## 2024-11-14 NOTE — PROCEDURES
Large Joint Aspiration/Injection: R knee    Date/Time: 11/14/2024 2:45 PM    Performed by: Ramakrishna Toro MD  Authorized by: Ramakrishna Toro MD    Consent Done?:  Yes (Verbal)  Indications:  Arthritis  Timeout: prior to procedure the correct patient, procedure, and site was verified      Details:  Needle Size:  22 G  Ultrasonic Guidance for needle placement?: No    Approach:  Anterolateral  Location:  Knee  Site:  R knee  Medications:  48 mg hylan g-f 20 48 mg/6 mL  Patient tolerance:  Patient tolerated the procedure well with no immediate complications

## 2024-11-14 NOTE — LETTER
November 14, 2024       [facility]  [address], [address]  [facility] [address]  Phone: [phone]  Fax: [phone]       Patient: Bronwyn Mistry   YOB: 1955  Date of Visit: 11/14/2024    To Whom It May Concern:    [facility] Mistry  [facility] 11/14/[facility]. The patient may return to work on 11/15/2024  with no restrictions. If you have any questions or concerns, or if I can be of further assistance, please do not hesitate to contact me.    Sincerely,    Ramakrishna Toro MD ([facility])

## 2025-02-18 ENCOUNTER — OFFICE VISIT (OUTPATIENT)
Dept: ORTHOPEDICS | Facility: CLINIC | Age: 70
End: 2025-02-18
Payer: MEDICARE

## 2025-02-18 VITALS
HEIGHT: 64 IN | BODY MASS INDEX: 28.85 KG/M2 | DIASTOLIC BLOOD PRESSURE: 78 MMHG | WEIGHT: 169 LBS | HEART RATE: 85 BPM | SYSTOLIC BLOOD PRESSURE: 115 MMHG

## 2025-02-18 DIAGNOSIS — M17.11 PRIMARY OSTEOARTHRITIS OF RIGHT KNEE: Primary | ICD-10-CM

## 2025-02-18 PROCEDURE — 99214 OFFICE O/P EST MOD 30 MIN: CPT | Mod: ,,, | Performed by: ORTHOPAEDIC SURGERY

## 2025-06-12 ENCOUNTER — OFFICE VISIT (OUTPATIENT)
Dept: ORTHOPEDICS | Facility: CLINIC | Age: 70
End: 2025-06-12
Payer: MEDICARE

## 2025-06-12 VITALS
WEIGHT: 168 LBS | HEART RATE: 73 BPM | DIASTOLIC BLOOD PRESSURE: 75 MMHG | HEIGHT: 64 IN | BODY MASS INDEX: 28.68 KG/M2 | SYSTOLIC BLOOD PRESSURE: 116 MMHG

## 2025-06-12 DIAGNOSIS — M17.11 PRIMARY OSTEOARTHRITIS OF RIGHT KNEE: Primary | ICD-10-CM

## 2025-06-12 NOTE — PROCEDURES
Large Joint Aspiration/Injection: R knee    Date/Time: 6/12/2025 8:00 AM    Performed by: Ramakrishna Toro MD  Authorized by: Ramakrishna Toro MD    Consent Done?:  Yes (Verbal)  Indications:  Arthritis  Timeout: prior to procedure the correct patient, procedure, and site was verified      Details:  Needle Size:  22 G  Ultrasonic Guidance for needle placement?: No    Approach:  Anterolateral  Location:  Knee  Site:  R knee  Medications:  48 mg hylan g-f 20 48 mg/6 mL  Patient tolerance:  Patient tolerated the procedure well with no immediate complications

## 2025-06-12 NOTE — PROGRESS NOTES
Chief Complaint:   Chief Complaint   Patient presents with    Injections     Here for Rt Knee injection patient states it hurts she is dragging it on it is swollen some on today.pain is getting worse  she has fallen about 4xs. Injection lasted about 3mons        History of present illness:    History of Present Illness  The patient presents for a gel injection in her right knee.    She has previously received a gel injection in her right knee, although it has been some time since the last administration. She has experienced 4 episodes of falling, the cause of which remains unknown to her. She reports no instances of her knee buckling or becoming weak prior to these falls. The use of a cane has been beneficial in providing support. A recent cortisone injection provided relief for a duration of approximately 3 months.    Past Medical History:   Diagnosis Date    Anxiety disorder, unspecified     Depression     Hypertension     Malignant melanoma of skin, unspecified        Past Surgical History:   Procedure Laterality Date    BASAL CELL CARCINOMA EXCISION Right 4/17/2023    Procedure: EXCISION, CARCINOMA, BASAL CELL RIGHT ARM;  Surgeon: Maco Cornejo MD;  Location: [facility];  Service: Plastics;  Laterality: Right;  right arm       Current Outpatient Medications   Medication Sig    amitriptyline (ELAVIL) 150 MG Tab Take 150 mg by mouth every evening.    anastrozole (ARIMIDEX) 1 mg Tab Take 1 mg by mouth.    brexpiprazole (REXULTI) 2 mg Tab Take 1 tablet by mouth once daily.    calcium carbonate (OS-SALLIE) 600 mg calcium (1,500 mg) Tab Take 1 tablet by mouth once daily.    clonazePAM (KLONOPIN) 1 MG tablet Take 1 mg by mouth 2 (two) times daily as needed.    ergocalciferol (ERGOCALCIFEROL) 50,000 unit Cap Take 50,000 Units by mouth every 7 days.    HYDROcodone-acetaminophen (NORCO) 5-325 mg per tablet Take 1 tablet by mouth every 6 (six) hours as needed for Pain.    propranoloL (INDERAL) 20 MG tablet Take 20 mg by  mouth 2 (two) times daily.    rosuvastatin (CRESTOR) 10 MG tablet Take 10 mg by mouth.    venlafaxine 75 mg TR24 Take 1 tablet by mouth.    VRAYLAR 1.5 mg Cap Take 1.5 mg by mouth. (Patient not taking: Reported on 6/12/2025)     Current Facility-Administered Medications   Medication    LIDOcaine HCL 10 mg/ml (1%) injection 5 mL     Facility-Administered Medications Ordered in Other Visits   Medication    lactated ringers infusion       Review of patient's allergies indicates:   Allergen Reactions    Penicillins        Family History   Problem Relation Name Age of Onset    Breast cancer Sister         Social History[1]        Review of Systems:    Constitution: Negative for chills, fever, and sweats.  Negative for unexplained weight loss.    HENT:  Negative for headaches and blurry vision.    Cardiovascular:Negative for chest pain or irregular heart beat. Negative for hypertension.    Respiratory:  Negative for cough and shortness of breath.    Gastrointestinal: Negative for abdominal pain, heartburn, melena, nausea, and vomitting.    Genitourinary:  Negative bladder incontinence and dysuria.    Musculoskeletal:  See HPI    Neurological: Negative for numbness.    Psychiatric/Behavioral: Negative for depression.  The patient is not nervous/anxious.      Endocrine: Negative for polyuria    Hematologic/Lymphatic: Negative for bleeding problem.  Does not bruise/bleed easily.    Skin: Negative for poor would healing and rash      Physical Examination:    Vital Signs:    Vitals:    06/12/25 0804   BP: 116/75   Pulse: 73       Body mass index is 28.84 kg/m².    General: No acute distress, alert and oriented, healthy appearing    HEENT: Head is atraumatic, mucous membranes are moist    Neck: Supples, no JVD    Cardiovascular: Palpable dorsalis pedis and posterior tibial pulses, regular rate and rhythm to those pulses    Lungs: Breathing non-labored    Skin: no rashes appreciated    Neurologic: Can flex and extend knees,  ankles, and toes. Sensation is grossly intact    Right knee:  Brisk cap refill disappeared sensation intact distally range of motion right knee with a some crepitus.  Sensation intact distally.    X-rays:      Assessment::  Right knee osteoarthritis    Plan:  Synvisc injections today.  This has worked fairly well for her in the past.  We will see her in 6 months or sooner if this fails to relieve her symptoms long term    This note was generated with the assistance of ambient listening technology. Verbal consent was obtained by the patient and accompanying visitor(s) for the recording of patient appointment to facilitate this note. I attest to having reviewed and edited the generated note for accuracy, though some syntax or spelling errors may persist. Please contact the author of this note for any clarification.      This note was created using [email or web address] voice recognition software that occasionally misinterpreted phrases or words.    Consult note is delivered via Epic messaging service.         [1]   Social History  Socioeconomic History    Marital status: Single   Tobacco Use    Smoking status: Never    Smokeless tobacco: Never   Substance and Sexual Activity    Alcohol use: Not Currently     Comment: occ    Drug use: Not Currently    Sexual activity: Yes     Partners: Male

## (undated) DEVICE — Device

## (undated) DEVICE — SUT 2-0 ETHILON 18 FS

## (undated) DEVICE — SUT 3-0 MONOCRYL PLUS PS-2

## (undated) DEVICE — NDL SAFETY 21G X 1 IN ECLIPSE

## (undated) DEVICE — SYR 10CC LUER LOCK

## (undated) DEVICE — GLOVE PROTEXIS NEOPRN SZ6.5

## (undated) DEVICE — DRAPE FULL SHEET 70X100IN

## (undated) DEVICE — GLOVE PROTEXIS LTX MICRO 8

## (undated) DEVICE — GOWN POLY REINF BRTH SLV LG

## (undated) DEVICE — GLOVE PROTEXIS PI SYN SURG 7

## (undated) DEVICE — MANIFOLD 4 PORT

## (undated) DEVICE — SUT 3-0 VICRYL / SH (J416)

## (undated) DEVICE — KIT SURGICAL TURNOVER

## (undated) DEVICE — GLOVE PROTEXIS HYDROGEL SZ7

## (undated) DEVICE — MARKER WRITESITE SKIN CHLRAPRP

## (undated) DEVICE — GLOVE PROTEXIS BLUE LATEX 6.5

## (undated) DEVICE — GOWN POLY REINF BRTH SLV XL

## (undated) DEVICE — GLOVE PROTEXIS BLUE LATEX 7